# Patient Record
Sex: FEMALE | Race: BLACK OR AFRICAN AMERICAN | NOT HISPANIC OR LATINO | Employment: PART TIME | URBAN - METROPOLITAN AREA
[De-identification: names, ages, dates, MRNs, and addresses within clinical notes are randomized per-mention and may not be internally consistent; named-entity substitution may affect disease eponyms.]

---

## 2017-01-01 ENCOUNTER — OFFICE VISIT (OUTPATIENT)
Dept: VASCULAR SURGERY | Facility: CLINIC | Age: 52
End: 2017-01-01
Payer: MEDICAID

## 2017-01-01 ENCOUNTER — HOSPITAL ENCOUNTER (INPATIENT)
Facility: HOSPITAL | Age: 52
LOS: 2 days | DRG: 023 | End: 2017-12-13
Attending: EMERGENCY MEDICINE | Admitting: PSYCHIATRY & NEUROLOGY
Payer: MEDICARE

## 2017-01-01 ENCOUNTER — HISTORICAL (OUTPATIENT)
Dept: ADMINISTRATIVE | Facility: HOSPITAL | Age: 52
End: 2017-01-01

## 2017-01-01 ENCOUNTER — HOSPITAL ENCOUNTER (EMERGENCY)
Facility: HOSPITAL | Age: 52
Discharge: HOME | End: 2017-03-15
Attending: EMERGENCY MEDICINE
Payer: MEDICAID

## 2017-01-01 VITALS
OXYGEN SATURATION: 99 % | WEIGHT: 122 LBS | BODY MASS INDEX: 22.45 KG/M2 | SYSTOLIC BLOOD PRESSURE: 195 MMHG | TEMPERATURE: 98 F | HEIGHT: 62 IN | RESPIRATION RATE: 16 BRPM | HEART RATE: 88 BPM | DIASTOLIC BLOOD PRESSURE: 80 MMHG

## 2017-01-01 VITALS
SYSTOLIC BLOOD PRESSURE: 184 MMHG | DIASTOLIC BLOOD PRESSURE: 76 MMHG | HEIGHT: 62 IN | HEART RATE: 56 BPM | WEIGHT: 123.44 LBS | BODY MASS INDEX: 22.71 KG/M2

## 2017-01-01 DIAGNOSIS — R56.9 SEIZURE: ICD-10-CM

## 2017-01-01 DIAGNOSIS — A59.9 TRICHOMONAL INFECTION: Primary | ICD-10-CM

## 2017-01-01 DIAGNOSIS — I63.412 EMBOLIC STROKE INVOLVING LEFT MIDDLE CEREBRAL ARTERY: ICD-10-CM

## 2017-01-01 DIAGNOSIS — I65.23 BILATERAL CAROTID ARTERY STENOSIS: Primary | ICD-10-CM

## 2017-01-01 DIAGNOSIS — Z98.890 S/P CAROTID ENDARTERECTOMY: ICD-10-CM

## 2017-01-01 DIAGNOSIS — D62 ACUTE BLOOD LOSS ANEMIA: ICD-10-CM

## 2017-01-01 DIAGNOSIS — I63.9 CEREBROVASCULAR ACCIDENT (CVA), UNSPECIFIED MECHANISM: Primary | ICD-10-CM

## 2017-01-01 DIAGNOSIS — K92.2 GASTROINTESTINAL HEMORRHAGE, UNSPECIFIED GASTROINTESTINAL HEMORRHAGE TYPE: ICD-10-CM

## 2017-01-01 DIAGNOSIS — J96.01 ACUTE RESPIRATORY FAILURE WITH HYPOXIA: ICD-10-CM

## 2017-01-01 DIAGNOSIS — R10.30 LOWER ABDOMINAL PAIN: ICD-10-CM

## 2017-01-01 DIAGNOSIS — N17.9 AKI (ACUTE KIDNEY INJURY): ICD-10-CM

## 2017-01-01 DIAGNOSIS — G93.40 ACUTE ENCEPHALOPATHY: ICD-10-CM

## 2017-01-01 DIAGNOSIS — D64.9 SYMPTOMATIC ANEMIA: ICD-10-CM

## 2017-01-01 LAB
ABO + RH BLD: NORMAL
ABO + RH BLD: NORMAL
ALBUMIN SERPL BCP-MCNC: 1.1 G/DL
ALBUMIN SERPL BCP-MCNC: 1.2 G/DL
ALBUMIN SERPL BCP-MCNC: 1.4 G/DL
ALBUMIN SERPL BCP-MCNC: 1.5 G/DL
ALBUMIN SERPL BCP-MCNC: 1.6 G/DL
ALBUMIN SERPL BCP-MCNC: 1.7 G/DL
ALBUMIN SERPL BCP-MCNC: 1.9 G/DL
ALBUMIN SERPL BCP-MCNC: 2 G/DL
ALBUMIN SERPL BCP-MCNC: 2.3 G/DL
ALBUMIN SERPL BCP-MCNC: 3 G/DL
ALBUMIN SERPL BCP-MCNC: 3.7 G/DL
ALBUMIN SERPL BCP-MCNC: 3.8 G/DL
ALBUMIN SERPL-MCNC: 3.4 G/DL (ref 3.1–4.7)
ALBUMIN SERPL-MCNC: 3.8 G/DL (ref 3.1–4.7)
ALLENS TEST: ABNORMAL
ALP SERPL-CCNC: 106 U/L
ALP SERPL-CCNC: 110 U/L
ALP SERPL-CCNC: 117 U/L
ALP SERPL-CCNC: 119 IU/L (ref 40–104)
ALP SERPL-CCNC: 120 U/L
ALP SERPL-CCNC: 127 U/L
ALP SERPL-CCNC: 136 U/L
ALP SERPL-CCNC: 137 U/L
ALP SERPL-CCNC: 148 U/L
ALP SERPL-CCNC: 157 U/L
ALP SERPL-CCNC: 190 U/L
ALP SERPL-CCNC: 194 U/L
ALP SERPL-CCNC: 225 U/L
ALP SERPL-CCNC: 99 IU/L (ref 40–104)
ALT (SGPT): 17 IU/L (ref 3–33)
ALT (SGPT): 24 IU/L (ref 3–33)
ALT SERPL W/O P-5'-P-CCNC: 12 U/L
ALT SERPL W/O P-5'-P-CCNC: 14 U/L
ALT SERPL W/O P-5'-P-CCNC: 2879 U/L
ALT SERPL W/O P-5'-P-CCNC: 29 U/L
ALT SERPL W/O P-5'-P-CCNC: 3692 U/L
ALT SERPL W/O P-5'-P-CCNC: 3753 U/L
ALT SERPL W/O P-5'-P-CCNC: 3816 U/L
ALT SERPL W/O P-5'-P-CCNC: 4333 U/L
ALT SERPL W/O P-5'-P-CCNC: 4851 U/L
ALT SERPL W/O P-5'-P-CCNC: 49 U/L
ALT SERPL W/O P-5'-P-CCNC: 5904 U/L
ALT SERPL W/O P-5'-P-CCNC: 5906 U/L
AMIODARONE FLD-MCNC: <0.1 UG/ML (ref 1–3)
AMORPH CRY UR QL COMP ASSIST: ABNORMAL
ANION GAP SERPL CALC-SCNC: 12 MMOL/L
ANION GAP SERPL CALC-SCNC: 13 MMOL/L
ANION GAP SERPL CALC-SCNC: 13 MMOL/L
ANION GAP SERPL CALC-SCNC: 18 MMOL/L
ANION GAP SERPL CALC-SCNC: 28 MMOL/L
ANION GAP SERPL CALC-SCNC: 29 MMOL/L
ANION GAP SERPL CALC-SCNC: 34 MMOL/L
ANION GAP SERPL CALC-SCNC: 35 MMOL/L
ANION GAP SERPL CALC-SCNC: 39 MMOL/L
ANION GAP SERPL CALC-SCNC: 51 MMOL/L
ANION GAP SERPL CALC-SCNC: 53 MMOL/L
ANION GAP SERPL CALC-SCNC: 73 MMOL/L
ANISOCYTOSIS BLD QL SMEAR: ABNORMAL
ANISOCYTOSIS BLD QL SMEAR: SLIGHT
AORTIC VALVE REGURGITATION: NORMAL
APAP SERPL-MCNC: 3 UG/ML
APTT BLDCRRT: 30.4 SEC
APTT BLDCRRT: 33.2 SEC
APTT BLDCRRT: 37.7 SEC
APTT BLDCRRT: 49.2 SEC
APTT BLDCRRT: 50.4 SEC
AST SERPL-CCNC: 16 U/L
AST SERPL-CCNC: 23 U/L
AST SERPL-CCNC: 32 IU/L (ref 10–40)
AST SERPL-CCNC: 33 IU/L (ref 10–40)
AST SERPL-CCNC: 3684 U/L
AST SERPL-CCNC: 3801 U/L
AST SERPL-CCNC: 54 U/L
AST SERPL-CCNC: 69 U/L
AST SERPL-CCNC: 7391 U/L
AST SERPL-CCNC: 8052 U/L
AST SERPL-CCNC: 8165 U/L
AST SERPL-CCNC: 9322 U/L
AST SERPL-CCNC: 9340 U/L
AST SERPL-CCNC: ABNORMAL U/L
BACTERIA #/AREA URNS AUTO: ABNORMAL /HPF
BACTERIA #/AREA URNS HPF: ABNORMAL /HPF
BACTERIA GENITAL QL WET PREP: ABNORMAL
BASOPHILS # BLD AUTO: 0.01 K/UL
BASOPHILS # BLD AUTO: 0.02 K/UL
BASOPHILS # BLD AUTO: 0.03 K/UL
BASOPHILS # BLD AUTO: 0.03 K/UL
BASOPHILS NFR BLD: 0 %
BASOPHILS NFR BLD: 0.1 %
BASOPHILS NFR BLD: 0.2 %
BASOPHILS NFR BLD: 0.2 %
BASOPHILS NFR BLD: 0.4 %
BILIRUB SERPL-MCNC: 0.2 MG/DL
BILIRUB SERPL-MCNC: 0.4 MG/DL
BILIRUB SERPL-MCNC: 0.4 MG/DL (ref 0.3–1)
BILIRUB SERPL-MCNC: 0.5 MG/DL
BILIRUB SERPL-MCNC: 0.6 MG/DL (ref 0.3–1)
BILIRUB SERPL-MCNC: 0.8 MG/DL
BILIRUB SERPL-MCNC: 1.4 MG/DL
BILIRUB SERPL-MCNC: 2.1 MG/DL
BILIRUB SERPL-MCNC: 2.2 MG/DL
BILIRUB SERPL-MCNC: 2.5 MG/DL
BILIRUB SERPL-MCNC: 2.5 MG/DL
BILIRUB SERPL-MCNC: 3.1 MG/DL
BILIRUB UR QL STRIP: NEGATIVE
BILIRUB UR QL STRIP: NEGATIVE
BLD GP AB SCN CELLS X3 SERPL QL: NORMAL
BLD GP AB SCN CELLS X3 SERPL QL: NORMAL
BLD PROD TYP BPU: NORMAL
BLOOD UNIT EXPIRATION DATE: NORMAL
BLOOD UNIT TYPE CODE: 5100
BLOOD UNIT TYPE: NORMAL
BNP SERPL-MCNC: 1163 PG/ML
BUN SERPL-MCNC: 12 MG/DL (ref 8–20)
BUN SERPL-MCNC: 13 MG/DL
BUN SERPL-MCNC: 17 MG/DL
BUN SERPL-MCNC: 22 MG/DL
BUN SERPL-MCNC: 22 MG/DL
BUN SERPL-MCNC: 23 MG/DL
BUN SERPL-MCNC: 24 MG/DL
BUN SERPL-MCNC: 25 MG/DL
BUN SERPL-MCNC: 25 MG/DL
BUN SERPL-MCNC: 28 MG/DL
BUN SERPL-MCNC: 28 MG/DL
BUN SERPL-MCNC: 5 MG/DL (ref 6–30)
BUN SERPL-MCNC: 6 MG/DL
BUN SERPL-MCNC: 7 MG/DL
BUN SERPL-MCNC: 8 MG/DL (ref 8–20)
BURR CELLS BLD QL SMEAR: ABNORMAL
CA-I BLDV-SCNC: 0.97 MMOL/L
CALCIUM SERPL-MCNC: 10 MG/DL
CALCIUM SERPL-MCNC: 11.4 MG/DL
CALCIUM SERPL-MCNC: 7.1 MG/DL
CALCIUM SERPL-MCNC: 7.2 MG/DL
CALCIUM SERPL-MCNC: 7.6 MG/DL
CALCIUM SERPL-MCNC: 8.1 MG/DL
CALCIUM SERPL-MCNC: 8.3 MG/DL (ref 7.7–10.4)
CALCIUM SERPL-MCNC: 8.4 MG/DL
CALCIUM SERPL-MCNC: 8.5 MG/DL
CALCIUM SERPL-MCNC: 8.6 MG/DL
CALCIUM SERPL-MCNC: 8.6 MG/DL
CALCIUM SERPL-MCNC: 8.6 MG/DL (ref 7.7–10.4)
CALCIUM SERPL-MCNC: 9.7 MG/DL
CALCIUM SERPL-MCNC: 9.8 MG/DL
CHLORIDE SERPL-SCNC: 100 MMOL/L
CHLORIDE SERPL-SCNC: 101 MMOL/L
CHLORIDE SERPL-SCNC: 102 MMOL/L
CHLORIDE SERPL-SCNC: 102 MMOL/L (ref 95–110)
CHLORIDE SERPL-SCNC: 104 MMOL/L
CHLORIDE SERPL-SCNC: 104 MMOL/L
CHLORIDE SERPL-SCNC: 105 MMOL/L
CHLORIDE SERPL-SCNC: 106 MMOL/L
CHLORIDE SERPL-SCNC: 84 MMOL/L
CHLORIDE SERPL-SCNC: 96 MMOL/L
CHLORIDE SERPL-SCNC: 98 MMOL/L
CHLORIDE SERPL-SCNC: 99 MMOL/L
CHLORIDE SERPL-SCNC: 99 MMOL/L
CHLORIDE: 89 MMOL/L (ref 98–110)
CHLORIDE: 90 MMOL/L (ref 98–110)
CHOLEST SERPL-MCNC: 189 MG/DL
CHOLEST SERPL-MCNC: 205 MG/DL
CHOLEST/HDLC SERPL: 3.7 {RATIO}
CHOLEST/HDLC SERPL: 3.8 {RATIO}
CK MB SERPL-MCNC: 14.7 NG/ML
CK MB SERPL-RTO: 6.3 %
CK SERPL-CCNC: 233 U/L
CK SERPL-CCNC: 233 U/L
CLARITY UR REFRACT.AUTO: ABNORMAL
CLARITY UR: CLEAR
CLUE CELLS VAG QL WET PREP: ABNORMAL
CO2 SERPL-SCNC: 10 MMOL/L
CO2 SERPL-SCNC: 13 MMOL/L
CO2 SERPL-SCNC: 15 MMOL/L
CO2 SERPL-SCNC: 15 MMOL/L
CO2 SERPL-SCNC: 16 MMOL/L
CO2 SERPL-SCNC: 18 MMOL/L
CO2 SERPL-SCNC: 21.6 MMOL/L (ref 22.8–31.6)
CO2 SERPL-SCNC: 22 MMOL/L
CO2 SERPL-SCNC: 25 MMOL/L
CO2 SERPL-SCNC: 28.6 MMOL/L (ref 22.8–31.6)
CO2 SERPL-SCNC: 29 MMOL/L
CO2 SERPL-SCNC: 6 MMOL/L
CODING SYSTEM: NORMAL
COLOR UR AUTO: ABNORMAL
COLOR UR: YELLOW
CREAT SERPL-MCNC: 0.5 MG/DL (ref 0.5–1.4)
CREAT SERPL-MCNC: 0.8 MG/DL
CREAT SERPL-MCNC: 1.1 MG/DL
CREAT SERPL-MCNC: 2 MG/DL
CREAT SERPL-MCNC: 2.1 MG/DL
CREAT SERPL-MCNC: 2.2 MG/DL
CREAT SERPL-MCNC: 2.3 MG/DL
CREAT SERPL-MCNC: 2.4 MG/DL
CREAT SERPL-MCNC: 2.4 MG/DL
CREAT SERPL-MCNC: 2.8 MG/DL
CREAT SERPL-MCNC: 3 MG/DL
CREATININE: 0.93 MG/DL (ref 0.6–1.4)
CREATININE: 1.06 MG/DL (ref 0.6–1.4)
DELSYS: ABNORMAL
DIFFERENTIAL METHOD: ABNORMAL
DIGOXIN SERPL-MCNC: 0.3 NG/ML
DIGOXIN SERPL-MCNC: 0.7 NG/ML
DIGOXIN SERPL-MCNC: 0.8 NG/ML
DISPENSE STATUS: NORMAL
EOSINOPHIL # BLD AUTO: 0 K/UL
EOSINOPHIL NFR BLD: 0 %
EOSINOPHIL NFR BLD: 0.1 %
EOSINOPHIL NFR BLD: 0.1 %
EOSINOPHIL NFR BLD: 19 %
ERYTHROCYTE [DISTWIDTH] IN BLOOD BY AUTOMATED COUNT: 17.2 %
ERYTHROCYTE [DISTWIDTH] IN BLOOD BY AUTOMATED COUNT: 17.3 %
ERYTHROCYTE [DISTWIDTH] IN BLOOD BY AUTOMATED COUNT: 17.3 %
ERYTHROCYTE [DISTWIDTH] IN BLOOD BY AUTOMATED COUNT: 19 %
ERYTHROCYTE [DISTWIDTH] IN BLOOD BY AUTOMATED COUNT: 19 %
ERYTHROCYTE [DISTWIDTH] IN BLOOD BY AUTOMATED COUNT: 19.5 %
ERYTHROCYTE [DISTWIDTH] IN BLOOD BY AUTOMATED COUNT: 19.6 %
ERYTHROCYTE [DISTWIDTH] IN BLOOD BY AUTOMATED COUNT: 20.3 %
ERYTHROCYTE [DISTWIDTH] IN BLOOD BY AUTOMATED COUNT: 20.3 %
ERYTHROCYTE [DISTWIDTH] IN BLOOD BY AUTOMATED COUNT: 20.4 %
ERYTHROCYTE [SEDIMENTATION RATE] IN BLOOD BY WESTERGREN METHOD: 12 MM/H
ERYTHROCYTE [SEDIMENTATION RATE] IN BLOOD BY WESTERGREN METHOD: 14 MM/H
ERYTHROCYTE [SEDIMENTATION RATE] IN BLOOD BY WESTERGREN METHOD: 16 MM/H
ERYTHROCYTE [SEDIMENTATION RATE] IN BLOOD BY WESTERGREN METHOD: 20 MM/H
ERYTHROCYTE [SEDIMENTATION RATE] IN BLOOD BY WESTERGREN METHOD: 22 MM/H
ERYTHROCYTE [SEDIMENTATION RATE] IN BLOOD BY WESTERGREN METHOD: 26 MM/H
EST. GFR  (AFRICAN AMERICAN): 19.8 ML/MIN/1.73 M^2
EST. GFR  (AFRICAN AMERICAN): 21.6 ML/MIN/1.73 M^2
EST. GFR  (AFRICAN AMERICAN): 26 ML/MIN/1.73 M^2
EST. GFR  (AFRICAN AMERICAN): 26 ML/MIN/1.73 M^2
EST. GFR  (AFRICAN AMERICAN): 27.3 ML/MIN/1.73 M^2
EST. GFR  (AFRICAN AMERICAN): 28.9 ML/MIN/1.73 M^2
EST. GFR  (AFRICAN AMERICAN): 30.5 ML/MIN/1.73 M^2
EST. GFR  (AFRICAN AMERICAN): 32.4 ML/MIN/1.73 M^2
EST. GFR  (AFRICAN AMERICAN): >60 ML/MIN/1.73 M^2
EST. GFR  (NON AFRICAN AMERICAN): 17.2 ML/MIN/1.73 M^2
EST. GFR  (NON AFRICAN AMERICAN): 18.7 ML/MIN/1.73 M^2
EST. GFR  (NON AFRICAN AMERICAN): 22.5 ML/MIN/1.73 M^2
EST. GFR  (NON AFRICAN AMERICAN): 22.5 ML/MIN/1.73 M^2
EST. GFR  (NON AFRICAN AMERICAN): 23.7 ML/MIN/1.73 M^2
EST. GFR  (NON AFRICAN AMERICAN): 25 ML/MIN/1.73 M^2
EST. GFR  (NON AFRICAN AMERICAN): 26.5 ML/MIN/1.73 M^2
EST. GFR  (NON AFRICAN AMERICAN): 28.1 ML/MIN/1.73 M^2
EST. GFR  (NON AFRICAN AMERICAN): 57.9 ML/MIN/1.73 M^2
EST. GFR  (NON AFRICAN AMERICAN): >60 ML/MIN/1.73 M^2
ESTIMATED AVG GLUCOSE: 117 MG/DL
FIBRINOGEN PPP-MCNC: 144 MG/DL
FIBRINOGEN PPP-MCNC: <70 MG/DL
FILAMENT FUNGI VAG WET PREP-#/AREA: ABNORMAL
FIO2: 100
FIO2: 40
FIO2: 50
FIO2: 60
FIO2: 70
FIO2: 70
GLUCOSE SERPL-MCNC: 110 MG/DL
GLUCOSE SERPL-MCNC: 126 MG/DL
GLUCOSE SERPL-MCNC: 134 MG/DL
GLUCOSE SERPL-MCNC: 191 MG/DL
GLUCOSE SERPL-MCNC: 252 MG/DL
GLUCOSE SERPL-MCNC: 255 MG/DL
GLUCOSE SERPL-MCNC: 262 MG/DL (ref 70–110)
GLUCOSE SERPL-MCNC: 271 MG/DL
GLUCOSE SERPL-MCNC: 416 MG/DL
GLUCOSE SERPL-MCNC: 483 MG/DL
GLUCOSE SERPL-MCNC: 61 MG/DL
GLUCOSE SERPL-MCNC: 760 MG/DL
GLUCOSE SERPL-MCNC: 83 MG/DL
GLUCOSE UR QL STRIP: ABNORMAL
GLUCOSE UR QL STRIP: NEGATIVE
GLUCOSE: 122 MG/DL (ref 70–99)
GLUCOSE: 84 MG/DL (ref 70–99)
HBA1C MFR BLD HPLC: 5.7 %
HCO3 UR-SCNC: 10.7 MMOL/L (ref 24–28)
HCO3 UR-SCNC: 11.1 MMOL/L (ref 24–28)
HCO3 UR-SCNC: 11.8 MMOL/L (ref 24–28)
HCO3 UR-SCNC: 12.7 MMOL/L (ref 24–28)
HCO3 UR-SCNC: 12.9 MMOL/L (ref 24–28)
HCO3 UR-SCNC: 13.3 MMOL/L (ref 24–28)
HCO3 UR-SCNC: 13.5 MMOL/L (ref 24–28)
HCO3 UR-SCNC: 14.6 MMOL/L (ref 24–28)
HCO3 UR-SCNC: 15 MMOL/L (ref 24–28)
HCO3 UR-SCNC: 15.5 MMOL/L (ref 24–28)
HCO3 UR-SCNC: 16.5 MMOL/L (ref 24–28)
HCO3 UR-SCNC: 20.2 MMOL/L (ref 24–28)
HCO3 UR-SCNC: 20.4 MMOL/L (ref 24–28)
HCO3 UR-SCNC: 20.4 MMOL/L (ref 24–28)
HCO3 UR-SCNC: 20.9 MMOL/L (ref 24–28)
HCO3 UR-SCNC: 7.5 MMOL/L (ref 24–28)
HCO3 UR-SCNC: 8.8 MMOL/L (ref 24–28)
HCO3 UR-SCNC: 9 MMOL/L (ref 24–28)
HCO3 UR-SCNC: 9.3 MMOL/L (ref 24–28)
HCO3 UR-SCNC: 9.5 MMOL/L (ref 24–28)
HCO3 UR-SCNC: 9.9 MMOL/L (ref 24–28)
HCO3 UR-SCNC: 9.9 MMOL/L (ref 24–28)
HCT VFR BLD AUTO: 18.8 %
HCT VFR BLD AUTO: 20.7 %
HCT VFR BLD AUTO: 22.7 %
HCT VFR BLD AUTO: 26.6 %
HCT VFR BLD AUTO: 27.2 %
HCT VFR BLD AUTO: 28.1 %
HCT VFR BLD AUTO: 30.2 %
HCT VFR BLD AUTO: 30.8 %
HCT VFR BLD AUTO: 31.4 % (ref 36–48)
HCT VFR BLD AUTO: 31.7 % (ref 36–48)
HCT VFR BLD AUTO: 32.9 %
HCT VFR BLD AUTO: 32.9 %
HCT VFR BLD AUTO: 39.2 %
HCT VFR BLD CALC: 15 %PCV (ref 36–54)
HCT VFR BLD CALC: 20 %PCV (ref 36–54)
HCT VFR BLD CALC: 21 %PCV (ref 36–54)
HCT VFR BLD CALC: 21 %PCV (ref 36–54)
HCT VFR BLD CALC: 22 %PCV (ref 36–54)
HCT VFR BLD CALC: 22 %PCV (ref 36–54)
HCT VFR BLD CALC: 27 %PCV (ref 36–54)
HCT VFR BLD CALC: 27 %PCV (ref 36–54)
HCT VFR BLD CALC: 40 %PCV (ref 36–54)
HCT VFR BLD CALC: 62 %PCV (ref 36–54)
HDLC SERPL-MCNC: 50 MG/DL
HDLC SERPL-MCNC: 56 MG/DL
HDLC SERPL: 26.5 %
HDLC SERPL: 27.3 %
HGB BLD-MCNC: 10.2 G/DL
HGB BLD-MCNC: 10.2 G/DL
HGB BLD-MCNC: 12.2 G/DL
HGB BLD-MCNC: 5.4 G/DL
HGB BLD-MCNC: 6.1 G/DL
HGB BLD-MCNC: 6.3 G/DL
HGB BLD-MCNC: 7.8 G/DL
HGB BLD-MCNC: 8.4 G/DL
HGB BLD-MCNC: 8.9 G/DL
HGB BLD-MCNC: 9.1 G/DL
HGB BLD-MCNC: 9.3 G/DL
HGB BLD-MCNC: 9.6 G/DL (ref 12–15)
HGB BLD-MCNC: 9.8 G/DL (ref 12–15)
HGB UR QL STRIP: ABNORMAL
HGB UR QL STRIP: NEGATIVE
HYALINE CASTS UR QL AUTO: 0 /LPF
HYPOCHROMIA BLD QL SMEAR: ABNORMAL
IMM GRANULOCYTES # BLD AUTO: 0.06 K/UL
IMM GRANULOCYTES # BLD AUTO: 0.12 K/UL
IMM GRANULOCYTES # BLD AUTO: 0.12 K/UL
IMM GRANULOCYTES # BLD AUTO: 0.21 K/UL
IMM GRANULOCYTES # BLD AUTO: 0.25 K/UL
IMM GRANULOCYTES # BLD AUTO: 0.31 K/UL
IMM GRANULOCYTES # BLD AUTO: 0.36 K/UL
IMM GRANULOCYTES # BLD AUTO: 0.37 K/UL
IMM GRANULOCYTES # BLD AUTO: 0.69 K/UL
IMM GRANULOCYTES NFR BLD AUTO: 0.5 %
IMM GRANULOCYTES NFR BLD AUTO: 0.6 %
IMM GRANULOCYTES NFR BLD AUTO: 1 %
IMM GRANULOCYTES NFR BLD AUTO: 1.5 %
IMM GRANULOCYTES NFR BLD AUTO: 2.1 %
IMM GRANULOCYTES NFR BLD AUTO: 2.3 %
IMM GRANULOCYTES NFR BLD AUTO: 2.5 %
IMM GRANULOCYTES NFR BLD AUTO: 2.6 %
IMM GRANULOCYTES NFR BLD AUTO: 4.5 %
INR PPP: 1.2
INR PPP: 1.8
INR PPP: 2.3
INR PPP: 2.4
INR PPP: 2.8
INR PPP: 4.7
KETONES UR QL STRIP: NEGATIVE
KETONES UR QL STRIP: NEGATIVE
LACTATE SERPL-SCNC: 1.3 MMOL/L
LACTATE SERPL-SCNC: >12 MMOL/L
LDLC SERPL CALC-MCNC: 127.6 MG/DL
LDLC SERPL CALC-MCNC: 136.6 MG/DL
LEUKOCYTE ESTERASE UR QL STRIP: ABNORMAL
LEUKOCYTE ESTERASE UR QL STRIP: NEGATIVE
LIPASE SERPL-CCNC: 18 U/L
LYMPHOCYTES # BLD AUTO: 0.5 K/UL
LYMPHOCYTES # BLD AUTO: 0.6 K/UL
LYMPHOCYTES # BLD AUTO: 0.7 K/UL
LYMPHOCYTES # BLD AUTO: 0.8 K/UL
LYMPHOCYTES # BLD AUTO: 0.9 K/UL
LYMPHOCYTES # BLD AUTO: 0.9 K/UL
LYMPHOCYTES # BLD AUTO: 1 K/UL
LYMPHOCYTES # BLD AUTO: 1.2 K/UL
LYMPHOCYTES # BLD AUTO: 2 K/UL
LYMPHOCYTES NFR BLD: 10.5 %
LYMPHOCYTES NFR BLD: 12 %
LYMPHOCYTES NFR BLD: 2.6 %
LYMPHOCYTES NFR BLD: 20 %
LYMPHOCYTES NFR BLD: 3.5 %
LYMPHOCYTES NFR BLD: 4.7 %
LYMPHOCYTES NFR BLD: 5 %
LYMPHOCYTES NFR BLD: 7.6 %
LYMPHOCYTES NFR BLD: 8.9 %
LYMPHOCYTES NFR BLD: 9.2 %
MAGNESIUM SERPL-MCNC: 1.9 MG/DL
MAGNESIUM SERPL-MCNC: 2 MG/DL
MAGNESIUM SERPL-MCNC: 2.4 MG/DL
MAGNESIUM SERPL-MCNC: 2.5 MG/DL
MCH RBC QN AUTO: 23.1 PG
MCH RBC QN AUTO: 23.8 PG (ref 25–35)
MCH RBC QN AUTO: 24.5 PG
MCH RBC QN AUTO: 24.6 PG
MCH RBC QN AUTO: 24.8 PG
MCH RBC QN AUTO: 25.7 PG
MCH RBC QN AUTO: 25.7 PG
MCH RBC QN AUTO: 25.8 PG
MCH RBC QN AUTO: 27.2 PG
MCH RBC QN AUTO: 27.5 PG
MCH RBC QN AUTO: 27.8 PG
MCHC RBC AUTO-ENTMCNC: 26.9 G/DL
MCHC RBC AUTO-ENTMCNC: 29.3 G/DL
MCHC RBC AUTO-ENTMCNC: 30.1 G/DL
MCHC RBC AUTO-ENTMCNC: 30.2 G/DL
MCHC RBC AUTO-ENTMCNC: 30.4 G/DL
MCHC RBC AUTO-ENTMCNC: 30.6 G/DL (ref 31–36)
MCHC RBC AUTO-ENTMCNC: 30.9 %
MCHC RBC AUTO-ENTMCNC: 30.9 G/DL
MCHC RBC AUTO-ENTMCNC: 31 G/DL
MCHC RBC AUTO-ENTMCNC: 31.1 G/DL
MCHC RBC AUTO-ENTMCNC: 31.7 G/DL
MCV RBC AUTO: 75 FL
MCV RBC AUTO: 77.9 FL (ref 79–98)
MCV RBC AUTO: 79 FL
MCV RBC AUTO: 80 FL
MCV RBC AUTO: 85 FL
MCV RBC AUTO: 86 FL
MCV RBC AUTO: 88 FL
MCV RBC AUTO: 88 FL
MCV RBC AUTO: 89 FL
MCV RBC AUTO: 89 FL
MCV RBC AUTO: 92 FL
MICROSCOPIC COMMENT: ABNORMAL
MICROSCOPIC COMMENT: ABNORMAL
MIN VOL: 7.7
MIN VOL: 8.51
MIN VOL: 8.78
MODE: ABNORMAL
MONOCYTES # BLD AUTO: 0.1 K/UL
MONOCYTES # BLD AUTO: 0.2 K/UL
MONOCYTES # BLD AUTO: 0.2 K/UL
MONOCYTES # BLD AUTO: 0.3 K/UL
MONOCYTES # BLD AUTO: 0.4 K/UL
MONOCYTES # BLD AUTO: 0.4 K/UL
MONOCYTES # BLD AUTO: 0.7 K/UL
MONOCYTES # BLD AUTO: 1.1 K/UL
MONOCYTES # BLD AUTO: 1.4 K/UL
MONOCYTES NFR BLD: 0.8 %
MONOCYTES NFR BLD: 1.7 %
MONOCYTES NFR BLD: 1.9 %
MONOCYTES NFR BLD: 10 %
MONOCYTES NFR BLD: 2 %
MONOCYTES NFR BLD: 2.7 %
MONOCYTES NFR BLD: 4.4 %
MONOCYTES NFR BLD: 5 %
MONOCYTES NFR BLD: 5.1 %
MONOCYTES NFR BLD: 5.5 %
N-DESETHYL-AMIODARONE: <0.1 UG/ML (ref 1–3)
NEUTROPHILS # BLD AUTO: 11.1 K/UL
NEUTROPHILS # BLD AUTO: 12.3 K/UL
NEUTROPHILS # BLD AUTO: 13.5 K/UL
NEUTROPHILS # BLD AUTO: 17.6 K/UL
NEUTROPHILS # BLD AUTO: 22.3 K/UL
NEUTROPHILS # BLD AUTO: 22.7 K/UL
NEUTROPHILS # BLD AUTO: 6.5 K/UL
NEUTROPHILS # BLD AUTO: 6.7 K/UL
NEUTROPHILS # BLD AUTO: 8.3 K/UL
NEUTROPHILS NFR BLD: 51 %
NEUTROPHILS NFR BLD: 81.4 %
NEUTROPHILS NFR BLD: 83.3 %
NEUTROPHILS NFR BLD: 84.4 %
NEUTROPHILS NFR BLD: 84.6 %
NEUTROPHILS NFR BLD: 84.8 %
NEUTROPHILS NFR BLD: 89.1 %
NEUTROPHILS NFR BLD: 90.5 %
NEUTROPHILS NFR BLD: 94.6 %
NEUTROPHILS NFR BLD: 95 %
NITRITE UR QL STRIP: NEGATIVE
NITRITE UR QL STRIP: NEGATIVE
NONHDLC SERPL-MCNC: 139 MG/DL
NONHDLC SERPL-MCNC: 149 MG/DL
NRBC BLD-RTO: 0 /100 WBC
NRBC BLD-RTO: 1 /100 WBC
NRBC BLD-RTO: 10 /100 WBC
NRBC BLD-RTO: 10 /100 WBC
NRBC BLD-RTO: 2 /100 WBC
NRBC BLD-RTO: 5 /100 WBC
NRBC BLD-RTO: 6 /100 WBC
NUCLEATED RBCS: 0 %
NUM UNITS TRANS FFP: NORMAL
OVALOCYTES BLD QL SMEAR: ABNORMAL
PCO2 BLDA: 15.2 MMHG (ref 35–45)
PCO2 BLDA: 18.4 MMHG (ref 35–45)
PCO2 BLDA: 18.6 MMHG (ref 35–45)
PCO2 BLDA: 18.8 MMHG (ref 35–45)
PCO2 BLDA: 19.6 MMHG (ref 35–45)
PCO2 BLDA: 20.8 MMHG (ref 35–45)
PCO2 BLDA: 21 MMHG (ref 35–45)
PCO2 BLDA: 21.1 MMHG (ref 35–45)
PCO2 BLDA: 21.5 MMHG (ref 35–45)
PCO2 BLDA: 22.6 MMHG (ref 35–45)
PCO2 BLDA: 22.9 MMHG (ref 35–45)
PCO2 BLDA: 24.2 MMHG (ref 35–45)
PCO2 BLDA: 24.5 MMHG (ref 35–45)
PCO2 BLDA: 24.5 MMHG (ref 35–45)
PCO2 BLDA: 26.6 MMHG (ref 35–45)
PCO2 BLDA: 26.9 MMHG (ref 35–45)
PCO2 BLDA: 28.4 MMHG (ref 35–45)
PCO2 BLDA: 29.3 MMHG (ref 35–45)
PCO2 BLDA: 29.5 MMHG (ref 35–45)
PCO2 BLDA: 30.5 MMHG (ref 35–45)
PCO2 BLDA: 42.8 MMHG (ref 35–45)
PCO2 BLDA: 43.8 MMHG (ref 35–45)
PEEP: 5
PH SMN: 6.96 [PH] (ref 7.35–7.45)
PH SMN: 7.16 [PH] (ref 7.35–7.45)
PH SMN: 7.17 [PH] (ref 7.35–7.45)
PH SMN: 7.21 [PH] (ref 7.35–7.45)
PH SMN: 7.21 [PH] (ref 7.35–7.45)
PH SMN: 7.25 [PH] (ref 7.35–7.45)
PH SMN: 7.26 [PH] (ref 7.35–7.45)
PH SMN: 7.29 [PH] (ref 7.35–7.45)
PH SMN: 7.31 [PH] (ref 7.35–7.45)
PH SMN: 7.36 [PH] (ref 7.35–7.45)
PH SMN: 7.37 [PH] (ref 7.35–7.45)
PH SMN: 7.37 [PH] (ref 7.35–7.45)
PH SMN: 7.38 [PH] (ref 7.35–7.45)
PH SMN: 7.39 [PH] (ref 7.35–7.45)
PH SMN: 7.4 [PH] (ref 7.35–7.45)
PH SMN: 7.44 [PH] (ref 7.35–7.45)
PH SMN: 7.45 [PH] (ref 7.35–7.45)
PH SMN: 7.47 [PH] (ref 7.35–7.45)
PH SMN: 7.49 [PH] (ref 7.35–7.45)
PH SMN: 7.49 [PH] (ref 7.35–7.45)
PH UR STRIP: 5 [PH] (ref 5–8)
PH UR STRIP: 6 [PH] (ref 5–8)
PHOSPHATE SERPL-MCNC: 11.7 MG/DL
PHOSPHATE SERPL-MCNC: 3.2 MG/DL
PHOSPHATE SERPL-MCNC: 5.7 MG/DL
PIP: 17
PIP: 20
PIP: 24
PIP: 25
PIP: 27
PIP: 27
PIP: 28
PIP: 42
PLATELET # BLD AUTO: 116 K/UL
PLATELET # BLD AUTO: 153 K/UL
PLATELET # BLD AUTO: 211 K/UL
PLATELET # BLD AUTO: 30 K/UL
PLATELET # BLD AUTO: 383 K/UL (ref 140–440)
PLATELET # BLD AUTO: 417 K/UL
PLATELET # BLD AUTO: 47 K/UL
PLATELET # BLD AUTO: 478 K/UL
PLATELET # BLD AUTO: 51 K/UL
PLATELET # BLD AUTO: 525 K/UL
PLATELET # BLD AUTO: 72 K/UL
PLATELET BLD QL SMEAR: ABNORMAL
PMV BLD AUTO: 10.6 FL
PMV BLD AUTO: 7.1 FL
PMV BLD AUTO: 8.7 FL
PMV BLD AUTO: 8.9 FL
PMV BLD AUTO: 9.7 FL
PMV BLD AUTO: 9.7 FL
PMV BLD AUTO: ABNORMAL FL
PO2 BLDA: 107 MMHG (ref 80–100)
PO2 BLDA: 118 MMHG (ref 80–100)
PO2 BLDA: 143 MMHG (ref 80–100)
PO2 BLDA: 191 MMHG (ref 80–100)
PO2 BLDA: 213 MMHG (ref 80–100)
PO2 BLDA: 240 MMHG (ref 80–100)
PO2 BLDA: 390 MMHG (ref 80–100)
PO2 BLDA: 63 MMHG (ref 80–100)
PO2 BLDA: 70 MMHG (ref 80–100)
PO2 BLDA: 71 MMHG (ref 80–100)
PO2 BLDA: 72 MMHG (ref 80–100)
PO2 BLDA: 74 MMHG (ref 80–100)
PO2 BLDA: 76 MMHG (ref 80–100)
PO2 BLDA: 76 MMHG (ref 80–100)
PO2 BLDA: 77 MMHG (ref 80–100)
PO2 BLDA: 82 MMHG (ref 80–100)
PO2 BLDA: 82 MMHG (ref 80–100)
PO2 BLDA: 83 MMHG (ref 80–100)
PO2 BLDA: 84 MMHG (ref 80–100)
PO2 BLDA: 85 MMHG (ref 80–100)
PO2 BLDA: 89 MMHG (ref 80–100)
PO2 BLDA: 93 MMHG (ref 80–100)
POC BE: -10 MMOL/L
POC BE: -11 MMOL/L
POC BE: -12 MMOL/L
POC BE: -13 MMOL/L
POC BE: -13 MMOL/L
POC BE: -14 MMOL/L
POC BE: -15 MMOL/L
POC BE: -15 MMOL/L
POC BE: -17 MMOL/L
POC BE: -17 MMOL/L
POC BE: -18 MMOL/L
POC BE: -18 MMOL/L
POC BE: -19 MMOL/L
POC BE: -21 MMOL/L
POC BE: -22 MMOL/L
POC BE: -3 MMOL/L
POC BE: -4 MMOL/L
POC BE: -9 MMOL/L
POC IONIZED CALCIUM: 0.64 MMOL/L (ref 1.06–1.42)
POC IONIZED CALCIUM: 0.66 MMOL/L (ref 1.06–1.42)
POC IONIZED CALCIUM: 0.68 MMOL/L (ref 1.06–1.42)
POC IONIZED CALCIUM: 0.75 MMOL/L (ref 1.06–1.42)
POC IONIZED CALCIUM: 0.83 MMOL/L (ref 1.06–1.42)
POC IONIZED CALCIUM: 0.87 MMOL/L (ref 1.06–1.42)
POC IONIZED CALCIUM: 0.88 MMOL/L (ref 1.06–1.42)
POC IONIZED CALCIUM: 0.94 MMOL/L (ref 1.06–1.42)
POC IONIZED CALCIUM: 0.99 MMOL/L (ref 1.06–1.42)
POC IONIZED CALCIUM: 1.13 MMOL/L (ref 1.06–1.42)
POC SATURATED O2: 100 % (ref 95–100)
POC SATURATED O2: 89 % (ref 95–100)
POC SATURATED O2: 92 % (ref 95–100)
POC SATURATED O2: 94 % (ref 95–100)
POC SATURATED O2: 94 % (ref 95–100)
POC SATURATED O2: 95 % (ref 95–100)
POC SATURATED O2: 96 % (ref 95–100)
POC SATURATED O2: 97 % (ref 95–100)
POC SATURATED O2: 98 % (ref 95–100)
POC SATURATED O2: 99 % (ref 95–100)
POC TCO2 (MEASURED): 19 MMOL/L (ref 23–29)
POC TCO2: 10 MMOL/L (ref 23–27)
POC TCO2: 11 MMOL/L (ref 23–27)
POC TCO2: 12 MMOL/L (ref 23–27)
POC TCO2: 12 MMOL/L (ref 23–27)
POC TCO2: 14 MMOL/L (ref 23–27)
POC TCO2: 15 MMOL/L (ref 23–27)
POC TCO2: 16 MMOL/L (ref 23–27)
POC TCO2: 17 MMOL/L (ref 23–27)
POC TCO2: 17 MMOL/L (ref 23–27)
POC TCO2: 21 MMOL/L (ref 23–27)
POC TCO2: 22 MMOL/L (ref 23–27)
POC TCO2: 8 MMOL/L (ref 23–27)
POC TCO2: 9 MMOL/L (ref 23–27)
POCT GLUCOSE: 105 MG/DL (ref 70–110)
POCT GLUCOSE: 165 MG/DL (ref 70–110)
POCT GLUCOSE: 170 MG/DL (ref 70–110)
POCT GLUCOSE: 181 MG/DL (ref 70–110)
POCT GLUCOSE: 186 MG/DL (ref 70–110)
POCT GLUCOSE: 193 MG/DL (ref 70–110)
POCT GLUCOSE: 211 MG/DL (ref 70–110)
POCT GLUCOSE: 217 MG/DL (ref 70–110)
POCT GLUCOSE: 220 MG/DL (ref 70–110)
POCT GLUCOSE: 220 MG/DL (ref 70–110)
POCT GLUCOSE: 226 MG/DL (ref 70–110)
POCT GLUCOSE: 245 MG/DL (ref 70–110)
POCT GLUCOSE: 275 MG/DL (ref 70–110)
POCT GLUCOSE: 303 MG/DL (ref 70–110)
POCT GLUCOSE: 382 MG/DL (ref 70–110)
POCT GLUCOSE: 398 MG/DL (ref 70–110)
POCT GLUCOSE: 400 MG/DL (ref 70–110)
POCT GLUCOSE: 414 MG/DL (ref 70–110)
POCT GLUCOSE: 52 MG/DL (ref 70–110)
POCT GLUCOSE: 73 MG/DL (ref 70–110)
POCT GLUCOSE: 99 MG/DL (ref 70–110)
POCT GLUCOSE: <20 MG/DL (ref 70–110)
POCT GLUCOSE: >500 MG/DL (ref 70–110)
POCT GLUCOSE: >500 MG/DL (ref 70–110)
POIKILOCYTOSIS BLD QL SMEAR: ABNORMAL
POIKILOCYTOSIS BLD QL SMEAR: SLIGHT
POLYCHROMASIA BLD QL SMEAR: ABNORMAL
POTASSIUM BLD-SCNC: 3.3 MMOL/L (ref 3.5–5.1)
POTASSIUM BLD-SCNC: 4 MMOL/L (ref 3.5–5.1)
POTASSIUM BLD-SCNC: 4.3 MMOL/L (ref 3.5–5.1)
POTASSIUM BLD-SCNC: 4.3 MMOL/L (ref 3.5–5.1)
POTASSIUM BLD-SCNC: 4.4 MMOL/L (ref 3.5–5.1)
POTASSIUM BLD-SCNC: 4.9 MMOL/L (ref 3.5–5.1)
POTASSIUM BLD-SCNC: 5.8 MMOL/L (ref 3.5–5.1)
POTASSIUM BLD-SCNC: 6 MMOL/L (ref 3.5–5.1)
POTASSIUM BLD-SCNC: 7.5 MMOL/L (ref 3.5–5.1)
POTASSIUM BLD-SCNC: 7.7 MMOL/L (ref 3.5–5.1)
POTASSIUM SERPL-SCNC: 3.3 MMOL/L
POTASSIUM SERPL-SCNC: 3.4 MMOL/L
POTASSIUM SERPL-SCNC: 3.4 MMOL/L
POTASSIUM SERPL-SCNC: 3.5 MMOL/L
POTASSIUM SERPL-SCNC: 3.5 MMOL/L
POTASSIUM SERPL-SCNC: 3.5 MMOL/L (ref 3.5–5)
POTASSIUM SERPL-SCNC: 3.7 MMOL/L
POTASSIUM SERPL-SCNC: 4.3 MMOL/L
POTASSIUM SERPL-SCNC: 4.6 MMOL/L
POTASSIUM SERPL-SCNC: 4.9 MMOL/L (ref 3.5–5)
POTASSIUM SERPL-SCNC: 5 MMOL/L
POTASSIUM SERPL-SCNC: 5.1 MMOL/L
POTASSIUM SERPL-SCNC: 5.8 MMOL/L
POTASSIUM SERPL-SCNC: 6.1 MMOL/L
POTASSIUM SERPL-SCNC: 8.8 MMOL/L
PROCALCITONIN SERPL IA-MCNC: 0.45 NG/ML
PROT SERPL-MCNC: 2.6 G/DL
PROT SERPL-MCNC: 2.7 G/DL
PROT SERPL-MCNC: 3.1 G/DL
PROT SERPL-MCNC: 3.6 G/DL
PROT SERPL-MCNC: 3.7 G/DL
PROT SERPL-MCNC: 4 G/DL
PROT SERPL-MCNC: 4.1 G/DL
PROT SERPL-MCNC: 4.1 G/DL
PROT SERPL-MCNC: 5.2 G/DL
PROT SERPL-MCNC: 6.5 G/DL (ref 6–8.2)
PROT SERPL-MCNC: 7.1 G/DL
PROT SERPL-MCNC: 7.4 G/DL (ref 6–8.2)
PROT SERPL-MCNC: 8.1 G/DL
PROT SERPL-MCNC: 8.5 G/DL
PROT UR QL STRIP: ABNORMAL
PROT UR QL STRIP: NEGATIVE
PROTHROMBIN TIME: 12.3 SEC
PROTHROMBIN TIME: 18.5 SEC
PROTHROMBIN TIME: 23.3 SEC
PROTHROMBIN TIME: 24 SEC
PROTHROMBIN TIME: 28.3 SEC
PROTHROMBIN TIME: 47.7 SEC
RBC # BLD AUTO: 2.32 M/UL
RBC # BLD AUTO: 2.46 M/UL
RBC # BLD AUTO: 3.03 M/UL
RBC # BLD AUTO: 3.06 M/UL
RBC # BLD AUTO: 3.2 M/UL
RBC # BLD AUTO: 3.54 M/UL
RBC # BLD AUTO: 3.6 M/UL
RBC # BLD AUTO: 4.03 M/UL (ref 3.5–5.5)
RBC # BLD AUTO: 4.14 M/UL
RBC # BLD AUTO: 4.41 M/UL
RBC # BLD AUTO: 4.97 M/UL
RBC #/AREA URNS AUTO: 3 /HPF (ref 0–4)
RBC #/AREA URNS HPF: 0 /HPF (ref 0–4)
RETIRED EF AND QEF - SEE NOTES: 45 (ref 55–65)
SAMPLE: ABNORMAL
SCHISTOCYTES BLD QL SMEAR: ABNORMAL
SCHISTOCYTES BLD QL SMEAR: PRESENT
SITE: ABNORMAL
SODIUM BLD-SCNC: 133 MMOL/L (ref 136–145)
SODIUM BLD-SCNC: 136 MMOL/L (ref 136–145)
SODIUM BLD-SCNC: 143 MMOL/L (ref 136–145)
SODIUM BLD-SCNC: 149 MMOL/L (ref 136–145)
SODIUM BLD-SCNC: 149 MMOL/L (ref 136–145)
SODIUM BLD-SCNC: 150 MMOL/L (ref 136–145)
SODIUM BLD-SCNC: 151 MMOL/L (ref 136–145)
SODIUM BLD-SCNC: 152 MMOL/L (ref 136–145)
SODIUM SERPL-SCNC: 135 MMOL/L
SODIUM SERPL-SCNC: 142 MMOL/L
SODIUM SERPL-SCNC: 146 MMOL/L
SODIUM SERPL-SCNC: 150 MMOL/L
SODIUM SERPL-SCNC: 150 MMOL/L
SODIUM SERPL-SCNC: 151 MMOL/L
SODIUM SERPL-SCNC: 156 MMOL/L
SODIUM SERPL-SCNC: 159 MMOL/L
SODIUM SERPL-SCNC: 167 MMOL/L
SODIUM SERPL-SCNC: 175 MMOL/L
SODIUM: 124 MMOL/L (ref 134–144)
SODIUM: 127 MMOL/L (ref 134–144)
SP GR UR STRIP: 1.01 (ref 1–1.03)
SP GR UR STRIP: <=1.005 (ref 1–1.03)
SP02: 100
SPECIMEN SOURCE: ABNORMAL
SQUAMOUS #/AREA URNS HPF: 1 /HPF
T VAGINALIS GENITAL QL WET PREP: ABNORMAL
T4 FREE SERPL-MCNC: 1.59 NG/DL
T4 FREE SERPL-MCNC: 1.76 NG/DL
TRANS ERYTHROCYTES VOL PATIENT: NORMAL ML
TRICHOMONAS UR QL MICRO: ABNORMAL
TRICUSPID VALVE REGURGITATION: NORMAL
TRIGL SERPL-MCNC: 57 MG/DL
TRIGL SERPL-MCNC: 62 MG/DL
TROPONIN I SERPL DL<=0.01 NG/ML-MCNC: 0.08 NG/ML
TROPONIN I SERPL DL<=0.01 NG/ML-MCNC: 0.17 NG/ML
TROPONIN I SERPL DL<=0.01 NG/ML-MCNC: 0.23 NG/ML
TSH SERPL DL<=0.005 MIU/L-ACNC: 0.11 UIU/ML
TSH SERPL DL<=0.005 MIU/L-ACNC: 0.12 UIU/ML
UNIT NUMBER: NORMAL
UNIT NUMBER: NORMAL
URN SPEC COLLECT METH UR: ABNORMAL
URN SPEC COLLECT METH UR: ABNORMAL
UROBILINOGEN UR STRIP-ACNC: NEGATIVE EU/DL
UROBILINOGEN UR STRIP-ACNC: NEGATIVE EU/DL
VT: 400
VT: 500
VT: 531
WBC # BLD AUTO: 12.97 K/UL
WBC # BLD AUTO: 13.36 K/UL
WBC # BLD AUTO: 14.91 K/UL
WBC # BLD AUTO: 19.78 K/UL
WBC # BLD AUTO: 23.95 K/UL
WBC # BLD AUTO: 26.38 K/UL
WBC # BLD AUTO: 6.9 K/UL
WBC # BLD AUTO: 7.84 K/UL
WBC # BLD AUTO: 8 K/UL
WBC # BLD AUTO: 8.4 K/UL (ref 5–10)
WBC # BLD AUTO: 9.8 K/UL
WBC #/AREA URNS AUTO: 2 /HPF (ref 0–5)
WBC #/AREA URNS HPF: 3 /HPF (ref 0–5)
WBC #/AREA VAG WET PREP: ABNORMAL
YEAST GENITAL QL WET PREP: ABNORMAL
YEAST UR QL AUTO: ABNORMAL

## 2017-01-01 PROCEDURE — 85007 BL SMEAR W/DIFF WBC COUNT: CPT

## 2017-01-01 PROCEDURE — 84439 ASSAY OF FREE THYROXINE: CPT

## 2017-01-01 PROCEDURE — 27000221 HC OXYGEN, UP TO 24 HOURS

## 2017-01-01 PROCEDURE — 63600175 PHARM REV CODE 636 W HCPCS: Performed by: PSYCHIATRY & NEUROLOGY

## 2017-01-01 PROCEDURE — 83605 ASSAY OF LACTIC ACID: CPT

## 2017-01-01 PROCEDURE — 93306 TTE W/DOPPLER COMPLETE: CPT

## 2017-01-01 PROCEDURE — 99285 EMERGENCY DEPT VISIT HI MDM: CPT | Mod: ,,, | Performed by: EMERGENCY MEDICINE

## 2017-01-01 PROCEDURE — 20000000 HC ICU ROOM

## 2017-01-01 PROCEDURE — 84132 ASSAY OF SERUM POTASSIUM: CPT

## 2017-01-01 PROCEDURE — 85610 PROTHROMBIN TIME: CPT

## 2017-01-01 PROCEDURE — 87040 BLOOD CULTURE FOR BACTERIA: CPT | Mod: 59

## 2017-01-01 PROCEDURE — 97110 THERAPEUTIC EXERCISES: CPT

## 2017-01-01 PROCEDURE — P9021 RED BLOOD CELLS UNIT: HCPCS

## 2017-01-01 PROCEDURE — 63600175 PHARM REV CODE 636 W HCPCS: Performed by: EMERGENCY MEDICINE

## 2017-01-01 PROCEDURE — 93005 ELECTROCARDIOGRAM TRACING: CPT

## 2017-01-01 PROCEDURE — 86901 BLOOD TYPING SEROLOGIC RH(D): CPT

## 2017-01-01 PROCEDURE — A4216 STERILE WATER/SALINE, 10 ML: HCPCS | Performed by: STUDENT IN AN ORGANIZED HEALTH CARE EDUCATION/TRAINING PROGRAM

## 2017-01-01 PROCEDURE — P9017 PLASMA 1 DONOR FRZ W/IN 8 HR: HCPCS

## 2017-01-01 PROCEDURE — 84295 ASSAY OF SERUM SODIUM: CPT

## 2017-01-01 PROCEDURE — 84439 ASSAY OF FREE THYROXINE: CPT | Mod: 91

## 2017-01-01 PROCEDURE — 63600175 PHARM REV CODE 636 W HCPCS: Performed by: STUDENT IN AN ORGANIZED HEALTH CARE EDUCATION/TRAINING PROGRAM

## 2017-01-01 PROCEDURE — 5A1945Z RESPIRATORY VENTILATION, 24-96 CONSECUTIVE HOURS: ICD-10-PCS | Performed by: PSYCHIATRY & NEUROLOGY

## 2017-01-01 PROCEDURE — 86920 COMPATIBILITY TEST SPIN: CPT

## 2017-01-01 PROCEDURE — 85027 COMPLETE CBC AUTOMATED: CPT

## 2017-01-01 PROCEDURE — 99024 POSTOP FOLLOW-UP VISIT: CPT | Mod: S$GLB,,, | Performed by: THORACIC SURGERY (CARDIOTHORACIC VASCULAR SURGERY)

## 2017-01-01 PROCEDURE — 25000003 PHARM REV CODE 250: Performed by: NURSE PRACTITIONER

## 2017-01-01 PROCEDURE — 25000003 PHARM REV CODE 250

## 2017-01-01 PROCEDURE — 83036 HEMOGLOBIN GLYCOSYLATED A1C: CPT

## 2017-01-01 PROCEDURE — 83690 ASSAY OF LIPASE: CPT

## 2017-01-01 PROCEDURE — 80053 COMPREHEN METABOLIC PANEL: CPT | Mod: 91

## 2017-01-01 PROCEDURE — 85730 THROMBOPLASTIN TIME PARTIAL: CPT

## 2017-01-01 PROCEDURE — 25000003 PHARM REV CODE 250: Performed by: PSYCHIATRY & NEUROLOGY

## 2017-01-01 PROCEDURE — 80061 LIPID PANEL: CPT

## 2017-01-01 PROCEDURE — 85014 HEMATOCRIT: CPT

## 2017-01-01 PROCEDURE — 87205 SMEAR GRAM STAIN: CPT

## 2017-01-01 PROCEDURE — 83735 ASSAY OF MAGNESIUM: CPT

## 2017-01-01 PROCEDURE — 84484 ASSAY OF TROPONIN QUANT: CPT | Mod: 91

## 2017-01-01 PROCEDURE — 63600175 PHARM REV CODE 636 W HCPCS: Performed by: NURSE PRACTITIONER

## 2017-01-01 PROCEDURE — 85610 PROTHROMBIN TIME: CPT | Mod: 91

## 2017-01-01 PROCEDURE — 85730 THROMBOPLASTIN TIME PARTIAL: CPT | Mod: 91

## 2017-01-01 PROCEDURE — 83880 ASSAY OF NATRIURETIC PEPTIDE: CPT

## 2017-01-01 PROCEDURE — 93010 ELECTROCARDIOGRAM REPORT: CPT | Mod: ,,, | Performed by: INTERNAL MEDICINE

## 2017-01-01 PROCEDURE — 27200966 HC CLOSED SUCTION SYSTEM

## 2017-01-01 PROCEDURE — 84443 ASSAY THYROID STIM HORMONE: CPT

## 2017-01-01 PROCEDURE — 99292 CRITICAL CARE ADDL 30 MIN: CPT | Mod: ICN,,, | Performed by: PSYCHIATRY & NEUROLOGY

## 2017-01-01 PROCEDURE — 36415 COLL VENOUS BLD VENIPUNCTURE: CPT

## 2017-01-01 PROCEDURE — P9012 CRYOPRECIPITATE EACH UNIT: HCPCS

## 2017-01-01 PROCEDURE — 25000242 PHARM REV CODE 250 ALT 637 W/ HCPCS: Performed by: NURSE PRACTITIONER

## 2017-01-01 PROCEDURE — 80053 COMPREHEN METABOLIC PANEL: CPT

## 2017-01-01 PROCEDURE — 99223 1ST HOSP IP/OBS HIGH 75: CPT | Mod: ,,, | Performed by: SURGERY

## 2017-01-01 PROCEDURE — 83735 ASSAY OF MAGNESIUM: CPT | Mod: 91

## 2017-01-01 PROCEDURE — 25000003 PHARM REV CODE 250: Performed by: STUDENT IN AN ORGANIZED HEALTH CARE EDUCATION/TRAINING PROGRAM

## 2017-01-01 PROCEDURE — 25000242 PHARM REV CODE 250 ALT 637 W/ HCPCS: Performed by: PHYSICIAN ASSISTANT

## 2017-01-01 PROCEDURE — 82803 BLOOD GASES ANY COMBINATION: CPT

## 2017-01-01 PROCEDURE — 97166 OT EVAL MOD COMPLEX 45 MIN: CPT

## 2017-01-01 PROCEDURE — 99292 CRITICAL CARE ADDL 30 MIN: CPT | Mod: 25,,, | Performed by: PSYCHIATRY & NEUROLOGY

## 2017-01-01 PROCEDURE — 36620 INSERTION CATHETER ARTERY: CPT | Mod: ,,, | Performed by: NURSE PRACTITIONER

## 2017-01-01 PROCEDURE — 25500020 PHARM REV CODE 255

## 2017-01-01 PROCEDURE — 85025 COMPLETE CBC W/AUTO DIFF WBC: CPT

## 2017-01-01 PROCEDURE — 81001 URINALYSIS AUTO W/SCOPE: CPT

## 2017-01-01 PROCEDURE — 80162 ASSAY OF DIGOXIN TOTAL: CPT | Mod: 91

## 2017-01-01 PROCEDURE — 43235 EGD DIAGNOSTIC BRUSH WASH: CPT | Mod: GC,,, | Performed by: INTERNAL MEDICINE

## 2017-01-01 PROCEDURE — 80162 ASSAY OF DIGOXIN TOTAL: CPT

## 2017-01-01 PROCEDURE — 96375 TX/PRO/DX INJ NEW DRUG ADDON: CPT

## 2017-01-01 PROCEDURE — G8989 SELF CARE D/C STATUS: HCPCS | Mod: CN

## 2017-01-01 PROCEDURE — B317YZZ FLUOROSCOPY OF LEFT INTERNAL CAROTID ARTERY USING OTHER CONTRAST: ICD-10-PCS | Performed by: RADIOLOGY

## 2017-01-01 PROCEDURE — 99291 CRITICAL CARE FIRST HOUR: CPT | Mod: 25,,, | Performed by: PSYCHIATRY & NEUROLOGY

## 2017-01-01 PROCEDURE — 87070 CULTURE OTHR SPECIMN AEROBIC: CPT

## 2017-01-01 PROCEDURE — 25000003 PHARM REV CODE 250: Performed by: EMERGENCY MEDICINE

## 2017-01-01 PROCEDURE — 82600 ASSAY OF CYANIDE: CPT

## 2017-01-01 PROCEDURE — 95951 PR EEG MONITORING/VIDEORECORD: CPT | Mod: 26,52,, | Performed by: PSYCHIATRY & NEUROLOGY

## 2017-01-01 PROCEDURE — 84100 ASSAY OF PHOSPHORUS: CPT | Mod: 91

## 2017-01-01 PROCEDURE — 99900026 HC AIRWAY MAINTENANCE (STAT)

## 2017-01-01 PROCEDURE — 84145 PROCALCITONIN (PCT): CPT

## 2017-01-01 PROCEDURE — C9113 INJ PANTOPRAZOLE SODIUM, VIA: HCPCS | Performed by: NURSE PRACTITIONER

## 2017-01-01 PROCEDURE — 83605 ASSAY OF LACTIC ACID: CPT | Mod: 91

## 2017-01-01 PROCEDURE — 80299 QUANTITATIVE ASSAY DRUG: CPT

## 2017-01-01 PROCEDURE — 81000 URINALYSIS NONAUTO W/SCOPE: CPT

## 2017-01-01 PROCEDURE — 85018 HEMOGLOBIN: CPT

## 2017-01-01 PROCEDURE — 99285 EMERGENCY DEPT VISIT HI MDM: CPT | Mod: 25

## 2017-01-01 PROCEDURE — 94761 N-INVAS EAR/PLS OXIMETRY MLT: CPT

## 2017-01-01 PROCEDURE — 84484 ASSAY OF TROPONIN QUANT: CPT

## 2017-01-01 PROCEDURE — 0DJ08ZZ INSPECTION OF UPPER INTESTINAL TRACT, VIA NATURAL OR ARTIFICIAL OPENING ENDOSCOPIC: ICD-10-PCS | Performed by: INTERNAL MEDICINE

## 2017-01-01 PROCEDURE — 80048 BASIC METABOLIC PNL TOTAL CA: CPT

## 2017-01-01 PROCEDURE — 85384 FIBRINOGEN ACTIVITY: CPT | Mod: 91

## 2017-01-01 PROCEDURE — 37799 UNLISTED PX VASCULAR SURGERY: CPT

## 2017-01-01 PROCEDURE — 63600175 PHARM REV CODE 636 W HCPCS: Performed by: PHYSICIAN ASSISTANT

## 2017-01-01 PROCEDURE — 86900 BLOOD TYPING SEROLOGIC ABO: CPT

## 2017-01-01 PROCEDURE — 94003 VENT MGMT INPAT SUBQ DAY: CPT

## 2017-01-01 PROCEDURE — 99900035 HC TECH TIME PER 15 MIN (STAT)

## 2017-01-01 PROCEDURE — 82553 CREATINE MB FRACTION: CPT

## 2017-01-01 PROCEDURE — 43235 EGD DIAGNOSTIC BRUSH WASH: CPT | Performed by: INTERNAL MEDICINE

## 2017-01-01 PROCEDURE — 84100 ASSAY OF PHOSPHORUS: CPT

## 2017-01-01 PROCEDURE — 25000003 PHARM REV CODE 250: Performed by: PHYSICIAN ASSISTANT

## 2017-01-01 PROCEDURE — 93306 TTE W/DOPPLER COMPLETE: CPT | Mod: 26,,, | Performed by: INTERNAL MEDICINE

## 2017-01-01 PROCEDURE — 80061 LIPID PANEL: CPT | Mod: 91

## 2017-01-01 PROCEDURE — 99292 CRITICAL CARE ADDL 30 MIN: CPT | Mod: ,,, | Performed by: NURSE PRACTITIONER

## 2017-01-01 PROCEDURE — 97802 MEDICAL NUTRITION INDIV IN: CPT

## 2017-01-01 PROCEDURE — 96368 THER/DIAG CONCURRENT INF: CPT

## 2017-01-01 PROCEDURE — 87210 SMEAR WET MOUNT SALINE/INK: CPT

## 2017-01-01 PROCEDURE — 85025 COMPLETE CBC W/AUTO DIFF WBC: CPT | Mod: 91

## 2017-01-01 PROCEDURE — 99291 CRITICAL CARE FIRST HOUR: CPT | Mod: ,,, | Performed by: PSYCHIATRY & NEUROLOGY

## 2017-01-01 PROCEDURE — 36430 TRANSFUSION BLD/BLD COMPNT: CPT

## 2017-01-01 PROCEDURE — G8987 SELF CARE CURRENT STATUS: HCPCS | Mod: CN

## 2017-01-01 PROCEDURE — 03CG3ZZ EXTIRPATION OF MATTER FROM INTRACRANIAL ARTERY, PERCUTANEOUS APPROACH: ICD-10-PCS | Performed by: RADIOLOGY

## 2017-01-01 PROCEDURE — G8988 SELF CARE GOAL STATUS: HCPCS | Mod: CM

## 2017-01-01 PROCEDURE — 80329 ANALGESICS NON-OPIOID 1 OR 2: CPT

## 2017-01-01 PROCEDURE — 96374 THER/PROPH/DIAG INJ IV PUSH: CPT

## 2017-01-01 PROCEDURE — 86850 RBC ANTIBODY SCREEN: CPT

## 2017-01-01 PROCEDURE — 96365 THER/PROPH/DIAG IV INF INIT: CPT

## 2017-01-01 PROCEDURE — 82330 ASSAY OF CALCIUM: CPT

## 2017-01-01 PROCEDURE — 99222 1ST HOSP IP/OBS MODERATE 55: CPT | Mod: 25,GC,, | Performed by: INTERNAL MEDICINE

## 2017-01-01 PROCEDURE — 02HV33Z INSERTION OF INFUSION DEVICE INTO SUPERIOR VENA CAVA, PERCUTANEOUS APPROACH: ICD-10-PCS | Performed by: PSYCHIATRY & NEUROLOGY

## 2017-01-01 PROCEDURE — 94002 VENT MGMT INPAT INIT DAY: CPT

## 2017-01-01 PROCEDURE — 99291 CRITICAL CARE FIRST HOUR: CPT | Mod: 25,GC,, | Performed by: PSYCHIATRY & NEUROLOGY

## 2017-01-01 PROCEDURE — 94640 AIRWAY INHALATION TREATMENT: CPT

## 2017-01-01 PROCEDURE — 99233 SBSQ HOSP IP/OBS HIGH 50: CPT | Mod: ,,, | Performed by: PSYCHIATRY & NEUROLOGY

## 2017-01-01 PROCEDURE — 25000003 PHARM REV CODE 250: Performed by: RADIOLOGY

## 2017-01-01 PROCEDURE — 63600175 PHARM REV CODE 636 W HCPCS

## 2017-01-01 PROCEDURE — 84443 ASSAY THYROID STIM HORMONE: CPT | Mod: 91

## 2017-01-01 RX ORDER — ALBUTEROL SULFATE 0.83 MG/ML
2.5 SOLUTION RESPIRATORY (INHALATION) ONCE
Status: COMPLETED | OUTPATIENT
Start: 2017-01-01 | End: 2017-01-01

## 2017-01-01 RX ORDER — DILTIAZEM HCL 1 MG/ML
5 INJECTION, SOLUTION INTRAVENOUS CONTINUOUS
Status: DISCONTINUED | OUTPATIENT
Start: 2017-01-01 | End: 2017-01-01

## 2017-01-01 RX ORDER — METOPROLOL SUCCINATE 50 MG/1
50 TABLET, EXTENDED RELEASE ORAL DAILY
Status: ON HOLD | COMMUNITY
End: 2017-01-01 | Stop reason: CLARIF

## 2017-01-01 RX ORDER — GLUCAGON 1 MG
1 KIT INJECTION
Status: DISCONTINUED | OUTPATIENT
Start: 2017-01-01 | End: 2017-12-14 | Stop reason: HOSPADM

## 2017-01-01 RX ORDER — PANTOPRAZOLE SODIUM 40 MG/1
TABLET, DELAYED RELEASE ORAL
Refills: 0 | COMMUNITY
Start: 2016-01-01

## 2017-01-01 RX ORDER — HYDROCHLOROTHIAZIDE 25 MG/1
TABLET ORAL
Refills: 1 | Status: ON HOLD | COMMUNITY
Start: 2017-01-01 | End: 2017-01-01 | Stop reason: CLARIF

## 2017-01-01 RX ORDER — FENTANYL CITRATE 50 UG/ML
INJECTION, SOLUTION INTRAMUSCULAR; INTRAVENOUS
Status: COMPLETED
Start: 2017-01-01 | End: 2017-01-01

## 2017-01-01 RX ORDER — EPINEPHRINE 1 MG/ML
INJECTION INTRAMUSCULAR; INTRAVENOUS; SUBCUTANEOUS CODE/TRAUMA/SEDATION MEDICATION
Status: COMPLETED | OUTPATIENT
Start: 2017-01-01 | End: 2017-01-01

## 2017-01-01 RX ORDER — SODIUM BICARBONATE 1 MEQ/ML
SYRINGE (ML) INTRAVENOUS CODE/TRAUMA/SEDATION MEDICATION
Status: COMPLETED | OUTPATIENT
Start: 2017-01-01 | End: 2017-01-01

## 2017-01-01 RX ORDER — SODIUM BICARBONATE 42 MG/ML
150 INJECTION, SOLUTION INTRAVENOUS ONCE
Status: DISCONTINUED | OUTPATIENT
Start: 2017-01-01 | End: 2017-01-01

## 2017-01-01 RX ORDER — SODIUM BICARBONATE 1 MEQ/ML
100 SYRINGE (ML) INTRAVENOUS ONCE
Status: COMPLETED | OUTPATIENT
Start: 2017-01-01 | End: 2017-01-01

## 2017-01-01 RX ORDER — LEVETIRACETAM 5 MG/ML
500 INJECTION INTRAVASCULAR EVERY 12 HOURS
Status: DISCONTINUED | OUTPATIENT
Start: 2017-01-01 | End: 2017-12-14 | Stop reason: HOSPADM

## 2017-01-01 RX ORDER — METOPROLOL TARTRATE 1 MG/ML
5 INJECTION, SOLUTION INTRAVENOUS ONCE
Status: COMPLETED | OUTPATIENT
Start: 2017-01-01 | End: 2017-01-01

## 2017-01-01 RX ORDER — PHENYLEPHRINE HYDROCHLORIDE 10 MG/ML
INJECTION INTRAVENOUS
Status: DISPENSED
Start: 2017-01-01 | End: 2017-01-01

## 2017-01-01 RX ORDER — POTASSIUM CHLORIDE 7.45 MG/ML
10 INJECTION INTRAVENOUS
Status: COMPLETED | OUTPATIENT
Start: 2017-01-01 | End: 2017-01-01

## 2017-01-01 RX ORDER — SODIUM BICARBONATE 1 MEQ/ML
150 SYRINGE (ML) INTRAVENOUS ONCE
Status: COMPLETED | OUTPATIENT
Start: 2017-01-01 | End: 2017-01-01

## 2017-01-01 RX ORDER — DICYCLOMINE HYDROCHLORIDE 10 MG/1
20 CAPSULE ORAL
Status: COMPLETED | OUTPATIENT
Start: 2017-01-01 | End: 2017-01-01

## 2017-01-01 RX ORDER — INSULIN ASPART 100 [IU]/ML
0-5 INJECTION, SOLUTION INTRAVENOUS; SUBCUTANEOUS EVERY 6 HOURS PRN
Status: DISCONTINUED | OUTPATIENT
Start: 2017-01-01 | End: 2017-01-01

## 2017-01-01 RX ORDER — PANTOPRAZOLE SODIUM 40 MG/10ML
40 INJECTION, POWDER, LYOPHILIZED, FOR SOLUTION INTRAVENOUS 2 TIMES DAILY
Status: DISCONTINUED | OUTPATIENT
Start: 2017-01-01 | End: 2017-12-14 | Stop reason: HOSPADM

## 2017-01-01 RX ORDER — ATROPINE SULFATE 0.4 MG/ML
INJECTION, SOLUTION ENDOTRACHEAL; INTRAMEDULLARY; INTRAMUSCULAR; INTRAVENOUS; SUBCUTANEOUS
Status: DISCONTINUED
Start: 2017-01-01 | End: 2017-12-14 | Stop reason: HOSPADM

## 2017-01-01 RX ORDER — CALCIUM CHLORIDE INJECTION 100 MG/ML
1 INJECTION, SOLUTION INTRAVENOUS ONCE
Status: COMPLETED | OUTPATIENT
Start: 2017-01-01 | End: 2017-01-01

## 2017-01-01 RX ORDER — PANTOPRAZOLE SODIUM 40 MG/10ML
40 INJECTION, POWDER, LYOPHILIZED, FOR SOLUTION INTRAVENOUS 2 TIMES DAILY
Status: DISCONTINUED | OUTPATIENT
Start: 2017-01-01 | End: 2017-01-01

## 2017-01-01 RX ORDER — NICARDIPINE HYDROCHLORIDE 0.2 MG/ML
5 INJECTION INTRAVENOUS CONTINUOUS
Status: DISCONTINUED | OUTPATIENT
Start: 2017-01-01 | End: 2017-01-01

## 2017-01-01 RX ORDER — DEXTROSE MONOHYDRATE 50 MG/ML
INJECTION, SOLUTION INTRAVENOUS
Status: DISCONTINUED
Start: 2017-01-01 | End: 2017-12-14 | Stop reason: HOSPADM

## 2017-01-01 RX ORDER — METHIMAZOLE 5 MG/1
10 TABLET ORAL 2 TIMES DAILY
Status: DISCONTINUED | OUTPATIENT
Start: 2017-01-01 | End: 2017-12-14 | Stop reason: HOSPADM

## 2017-01-01 RX ORDER — HYDROCODONE BITARTRATE AND ACETAMINOPHEN 5; 325 MG/1; MG/1
1 TABLET ORAL EVERY 6 HOURS PRN
Qty: 40 TABLET | Refills: 0
Start: 2017-01-01 | End: 2017-01-01

## 2017-01-01 RX ORDER — METOPROLOL SUCCINATE 50 MG/1
TABLET, EXTENDED RELEASE ORAL
COMMUNITY
Start: 2017-01-01 | End: 2017-01-01

## 2017-01-01 RX ORDER — FAMOTIDINE 20 MG/1
20 TABLET, FILM COATED ORAL 2 TIMES DAILY
Status: DISCONTINUED | OUTPATIENT
Start: 2017-01-01 | End: 2017-01-01

## 2017-01-01 RX ORDER — AMLODIPINE BESYLATE 5 MG/1
TABLET ORAL
Refills: 1 | COMMUNITY
Start: 2017-01-01 | End: 2017-01-01

## 2017-01-01 RX ORDER — DEXTROSE MONOHYDRATE 25 G/50ML
INJECTION, SOLUTION INTRAVENOUS
Status: COMPLETED | OUTPATIENT
Start: 2017-01-01 | End: 2017-01-01

## 2017-01-01 RX ORDER — DILTIAZEM HYDROCHLORIDE 60 MG/1
60 TABLET, FILM COATED ORAL EVERY 6 HOURS
Status: DISCONTINUED | OUTPATIENT
Start: 2017-01-01 | End: 2017-01-01

## 2017-01-01 RX ORDER — SODIUM BICARBONATE 1 MEQ/ML
150 SYRINGE (ML) INTRAVENOUS ONCE
Status: DISCONTINUED | OUTPATIENT
Start: 2017-12-14 | End: 2017-12-14 | Stop reason: HOSPADM

## 2017-01-01 RX ORDER — POLYETHYLENE GLYCOL 3350 17 G/17G
17 POWDER, FOR SOLUTION ORAL DAILY
Qty: 10 EACH | Refills: 0 | Status: ON HOLD | OUTPATIENT
Start: 2017-01-01 | End: 2017-01-01 | Stop reason: CLARIF

## 2017-01-01 RX ORDER — ATORVASTATIN CALCIUM 20 MG/1
40 TABLET, FILM COATED ORAL DAILY
Status: DISCONTINUED | OUTPATIENT
Start: 2017-01-01 | End: 2017-01-01

## 2017-01-01 RX ORDER — HYDROCODONE BITARTRATE AND ACETAMINOPHEN 10; 325 MG/1; MG/1
1 TABLET ORAL 2 TIMES DAILY PRN
Refills: 0 | COMMUNITY
Start: 2017-01-01 | End: 2017-01-01

## 2017-01-01 RX ORDER — SODIUM BICARBONATE 1 MEQ/ML
SYRINGE (ML) INTRAVENOUS
Status: DISPENSED
Start: 2017-01-01 | End: 2017-01-01

## 2017-01-01 RX ORDER — SODIUM CHLORIDE 9 MG/ML
INJECTION, SOLUTION INTRAVENOUS CONTINUOUS
Status: DISCONTINUED | OUTPATIENT
Start: 2017-01-01 | End: 2017-01-01

## 2017-01-01 RX ORDER — SODIUM BICARBONATE 1 MEQ/ML
200 SYRINGE (ML) INTRAVENOUS ONCE
Status: COMPLETED | OUTPATIENT
Start: 2017-01-01 | End: 2017-01-01

## 2017-01-01 RX ORDER — HYDROCODONE BITARTRATE AND ACETAMINOPHEN 500; 5 MG/1; MG/1
TABLET ORAL
Status: DISCONTINUED | OUTPATIENT
Start: 2017-01-01 | End: 2017-12-14 | Stop reason: HOSPADM

## 2017-01-01 RX ORDER — SODIUM CHLORIDE 0.9 % (FLUSH) 0.9 %
5 SYRINGE (ML) INJECTION
Status: DISCONTINUED | OUTPATIENT
Start: 2017-01-01 | End: 2017-12-14 | Stop reason: HOSPADM

## 2017-01-01 RX ORDER — METOPROLOL TARTRATE 1 MG/ML
INJECTION, SOLUTION INTRAVENOUS
Status: COMPLETED
Start: 2017-01-01 | End: 2017-01-01

## 2017-01-01 RX ORDER — LEVETIRACETAM 10 MG/ML
1000 INJECTION INTRAVASCULAR
Status: COMPLETED | OUTPATIENT
Start: 2017-01-01 | End: 2017-01-01

## 2017-01-01 RX ORDER — SODIUM BICARBONATE 1 MEQ/ML
SYRINGE (ML) INTRAVENOUS
Status: DISCONTINUED
Start: 2017-01-01 | End: 2017-12-14 | Stop reason: HOSPADM

## 2017-01-01 RX ORDER — FENTANYL CITRATE 50 UG/ML
INJECTION, SOLUTION INTRAMUSCULAR; INTRAVENOUS
Status: DISPENSED
Start: 2017-01-01 | End: 2017-01-01

## 2017-01-01 RX ORDER — FUROSEMIDE 10 MG/ML
40 INJECTION INTRAMUSCULAR; INTRAVENOUS ONCE
Status: DISCONTINUED | OUTPATIENT
Start: 2017-12-14 | End: 2017-12-14 | Stop reason: HOSPADM

## 2017-01-01 RX ORDER — ASPIRIN 81 MG/1
81 TABLET ORAL DAILY
Status: DISCONTINUED | OUTPATIENT
Start: 2017-01-01 | End: 2017-01-01

## 2017-01-01 RX ORDER — APIXABAN 5 MG/1
TABLET, FILM COATED ORAL
Refills: 0 | COMMUNITY
Start: 2016-01-01

## 2017-01-01 RX ORDER — AMIODARONE HYDROCHLORIDE 200 MG/1
200 TABLET ORAL 2 TIMES DAILY
Status: DISCONTINUED | OUTPATIENT
Start: 2017-01-01 | End: 2017-01-01

## 2017-01-01 RX ORDER — SODIUM CHLORIDE 0.9 % (FLUSH) 0.9 %
3 SYRINGE (ML) INJECTION EVERY 8 HOURS
Status: DISCONTINUED | OUTPATIENT
Start: 2017-01-01 | End: 2017-12-14 | Stop reason: HOSPADM

## 2017-01-01 RX ORDER — MORPHINE SULFATE 4 MG/ML
4 INJECTION, SOLUTION INTRAMUSCULAR; INTRAVENOUS
Status: COMPLETED | OUTPATIENT
Start: 2017-01-01 | End: 2017-01-01

## 2017-01-01 RX ORDER — NICARDIPINE HYDROCHLORIDE 0.2 MG/ML
2.5 INJECTION INTRAVENOUS CONTINUOUS
Status: DISCONTINUED | OUTPATIENT
Start: 2017-01-01 | End: 2017-01-01

## 2017-01-01 RX ORDER — DEXTROSE MONOHYDRATE 25 G/50ML
INJECTION, SOLUTION INTRAVENOUS
Status: DISCONTINUED
Start: 2017-01-01 | End: 2017-12-14 | Stop reason: HOSPADM

## 2017-01-01 RX ORDER — METOPROLOL TARTRATE 1 MG/ML
INJECTION, SOLUTION INTRAVENOUS
Status: DISPENSED
Start: 2017-01-01 | End: 2017-01-01

## 2017-01-01 RX ORDER — LEVETIRACETAM 10 MG/ML
1000 INJECTION INTRAVASCULAR
Status: DISCONTINUED | OUTPATIENT
Start: 2017-01-01 | End: 2017-01-01

## 2017-01-01 RX ORDER — PHENYLEPHRINE HCL IN 0.9% NACL 1 MG/10 ML
SYRINGE (ML) INTRAVENOUS
Status: DISPENSED
Start: 2017-01-01 | End: 2017-01-01

## 2017-01-01 RX ORDER — PROPRANOLOL HYDROCHLORIDE 10 MG/1
30 TABLET ORAL 3 TIMES DAILY
COMMUNITY

## 2017-01-01 RX ORDER — ONDANSETRON 2 MG/ML
4 INJECTION INTRAMUSCULAR; INTRAVENOUS
Status: COMPLETED | OUTPATIENT
Start: 2017-01-01 | End: 2017-01-01

## 2017-01-01 RX ORDER — CALCIUM CHLORIDE INJECTION 100 MG/ML
1 INJECTION, SOLUTION INTRAVENOUS ONCE
Status: DISCONTINUED | OUTPATIENT
Start: 2017-12-14 | End: 2017-12-14 | Stop reason: HOSPADM

## 2017-01-01 RX ORDER — CALCIUM CHLORIDE INJECTION 100 MG/ML
INJECTION, SOLUTION INTRAVENOUS
Status: DISCONTINUED
Start: 2017-01-01 | End: 2017-12-14 | Stop reason: HOSPADM

## 2017-01-01 RX ORDER — ATORVASTATIN CALCIUM 20 MG/1
80 TABLET, FILM COATED ORAL DAILY
Status: DISCONTINUED | OUTPATIENT
Start: 2017-01-01 | End: 2017-01-01

## 2017-01-01 RX ORDER — INDOMETHACIN 25 MG/1
150 CAPSULE ORAL ONCE
Status: DISCONTINUED | OUTPATIENT
Start: 2017-01-01 | End: 2017-01-01

## 2017-01-01 RX ORDER — FOLIC ACID 1 MG/1
1 TABLET ORAL DAILY
COMMUNITY

## 2017-01-01 RX ORDER — AMLODIPINE BESYLATE 5 MG/1
5 TABLET ORAL DAILY
Status: ON HOLD | COMMUNITY
End: 2017-01-01 | Stop reason: CLARIF

## 2017-01-01 RX ORDER — CHLORHEXIDINE GLUCONATE ORAL RINSE 1.2 MG/ML
15 SOLUTION DENTAL 2 TIMES DAILY
Status: DISCONTINUED | OUTPATIENT
Start: 2017-01-01 | End: 2017-12-14 | Stop reason: HOSPADM

## 2017-01-01 RX ORDER — POTASSIUM CHLORIDE 7.45 MG/ML
10 INJECTION INTRAVENOUS ONCE
Status: COMPLETED | OUTPATIENT
Start: 2017-01-01 | End: 2017-01-01

## 2017-01-01 RX ORDER — DIPHENHYDRAMINE HYDROCHLORIDE 50 MG/ML
12.5 INJECTION INTRAMUSCULAR; INTRAVENOUS
Status: COMPLETED | OUTPATIENT
Start: 2017-01-01 | End: 2017-01-01

## 2017-01-01 RX ORDER — ALBUTEROL SULFATE 0.83 MG/ML
10 SOLUTION RESPIRATORY (INHALATION) ONCE
Status: COMPLETED | OUTPATIENT
Start: 2017-01-01 | End: 2017-01-01

## 2017-01-01 RX ORDER — DILTIAZEM HYDROCHLORIDE 5 MG/ML
10 INJECTION INTRAVENOUS
Status: COMPLETED | OUTPATIENT
Start: 2017-01-01 | End: 2017-01-01

## 2017-01-01 RX ORDER — SODIUM BICARBONATE 1 MEQ/ML
SYRINGE (ML) INTRAVENOUS
Status: DISPENSED
Start: 2017-01-01 | End: 2017-12-14

## 2017-01-01 RX ORDER — METOCLOPRAMIDE HYDROCHLORIDE 5 MG/ML
10 INJECTION INTRAMUSCULAR; INTRAVENOUS ONCE
Status: COMPLETED | OUTPATIENT
Start: 2017-01-01 | End: 2017-01-01

## 2017-01-01 RX ORDER — HYDROCODONE BITARTRATE AND ACETAMINOPHEN 500; 5 MG/1; MG/1
TABLET ORAL
Status: DISCONTINUED | OUTPATIENT
Start: 2017-01-01 | End: 2017-01-01

## 2017-01-01 RX ORDER — VASOPRESSIN 20 [USP'U]/ML
INJECTION, SOLUTION INTRAMUSCULAR; SUBCUTANEOUS
Status: DISPENSED
Start: 2017-01-01 | End: 2017-01-01

## 2017-01-01 RX ORDER — METRONIDAZOLE 500 MG/1
500 TABLET ORAL 3 TIMES DAILY
Qty: 21 TABLET | Refills: 0 | Status: SHIPPED | OUTPATIENT
Start: 2017-01-01 | End: 2017-01-01

## 2017-01-01 RX ORDER — INSULIN ASPART 100 [IU]/ML
10 INJECTION, SOLUTION INTRAVENOUS; SUBCUTANEOUS ONCE
Status: DISCONTINUED | OUTPATIENT
Start: 2017-01-01 | End: 2017-12-14 | Stop reason: HOSPADM

## 2017-01-01 RX ORDER — DIGOXIN 125 MCG
125 TABLET ORAL DAILY
COMMUNITY

## 2017-01-01 RX ORDER — ATROPINE SULFATE 0.4 MG/ML
INJECTION, SOLUTION ENDOTRACHEAL; INTRAMEDULLARY; INTRAMUSCULAR; INTRAVENOUS; SUBCUTANEOUS
Status: DISPENSED
Start: 2017-01-01 | End: 2017-01-01

## 2017-01-01 RX ORDER — DIGOXIN 125 MCG
0.12 TABLET ORAL DAILY
Status: DISCONTINUED | OUTPATIENT
Start: 2017-01-01 | End: 2017-01-01

## 2017-01-01 RX ORDER — SPIRONOLACTONE 25 MG/1
25 TABLET ORAL DAILY
COMMUNITY

## 2017-01-01 RX ORDER — CITALOPRAM 10 MG/1
TABLET ORAL
Refills: 3 | COMMUNITY
Start: 2017-01-01 | End: 2017-01-01

## 2017-01-01 RX ORDER — DILTIAZEM HYDROCHLORIDE 240 MG/1
240 CAPSULE, COATED, EXTENDED RELEASE ORAL DAILY
COMMUNITY

## 2017-01-01 RX ORDER — METHIMAZOLE 10 MG/1
10 TABLET ORAL 2 TIMES DAILY
COMMUNITY

## 2017-01-01 RX ORDER — INSULIN ASPART 100 [IU]/ML
1-10 INJECTION, SOLUTION INTRAVENOUS; SUBCUTANEOUS EVERY 6 HOURS PRN
Status: DISCONTINUED | OUTPATIENT
Start: 2017-01-01 | End: 2017-12-14 | Stop reason: HOSPADM

## 2017-01-01 RX ORDER — FUROSEMIDE 10 MG/ML
INJECTION INTRAMUSCULAR; INTRAVENOUS
Status: DISCONTINUED
Start: 2017-01-01 | End: 2017-12-14 | Stop reason: HOSPADM

## 2017-01-01 RX ORDER — FENTANYL CITRAT/DEXTROSE 5%/PF 100 MCG/10
PATIENT CONTROLLED ANALGESIA SYRINGE INTRAVENOUS CONTINUOUS
Status: DISCONTINUED | OUTPATIENT
Start: 2017-01-01 | End: 2017-01-01

## 2017-01-01 RX ORDER — GLUCAGON 1 MG
1 KIT INJECTION
Status: DISCONTINUED | OUTPATIENT
Start: 2017-01-01 | End: 2017-01-01

## 2017-01-01 RX ORDER — NOREPINEPHRINE BITARTRATE/D5W 4MG/250ML
0.02 PLASTIC BAG, INJECTION (ML) INTRAVENOUS CONTINUOUS
Status: DISCONTINUED | OUTPATIENT
Start: 2017-01-01 | End: 2017-01-01

## 2017-01-01 RX ORDER — ISOSORBIDE MONONITRATE 30 MG/1
30 TABLET, EXTENDED RELEASE ORAL DAILY
Status: ON HOLD | COMMUNITY
End: 2017-01-01 | Stop reason: CLARIF

## 2017-01-01 RX ORDER — SODIUM BICARBONATE 42 MG/ML
100 INJECTION, SOLUTION INTRAVENOUS ONCE
Status: DISCONTINUED | OUTPATIENT
Start: 2017-01-01 | End: 2017-01-01

## 2017-01-01 RX ORDER — SODIUM BICARBONATE 1 MEQ/ML
150 SYRINGE (ML) INTRAVENOUS ONCE
Status: DISCONTINUED | OUTPATIENT
Start: 2017-01-01 | End: 2017-01-01

## 2017-01-01 RX ORDER — SERTRALINE HYDROCHLORIDE 50 MG/1
50 TABLET, FILM COATED ORAL DAILY
COMMUNITY

## 2017-01-01 RX ADMIN — METOROPROLOL TARTRATE 5 MG: 5 INJECTION, SOLUTION INTRAVENOUS at 05:12

## 2017-01-01 RX ADMIN — METOROPROLOL TARTRATE 5 MG: 5 INJECTION, SOLUTION INTRAVENOUS at 02:12

## 2017-01-01 RX ADMIN — SODIUM BICARBONATE 50 MEQ: 84 INJECTION, SOLUTION INTRAVENOUS at 08:12

## 2017-01-01 RX ADMIN — VANCOMYCIN HYDROCHLORIDE 750 MG: 750 INJECTION, POWDER, LYOPHILIZED, FOR SOLUTION INTRAVENOUS at 09:12

## 2017-01-01 RX ADMIN — SODIUM BICARBONATE 100 MEQ: 84 INJECTION INTRAVENOUS at 09:12

## 2017-01-01 RX ADMIN — METHIMAZOLE 10 MG: 5 TABLET ORAL at 11:12

## 2017-01-01 RX ADMIN — MORPHINE SULFATE 4 MG: 4 INJECTION INTRAVENOUS at 08:03

## 2017-01-01 RX ADMIN — ONDANSETRON 4 MG: 2 INJECTION INTRAMUSCULAR; INTRAVENOUS at 07:03

## 2017-01-01 RX ADMIN — INSULIN HUMAN 10 UNITS: 100 INJECTION, SOLUTION PARENTERAL at 08:12

## 2017-01-01 RX ADMIN — LEVETIRACETAM 500 MG: 5 INJECTION INTRAVENOUS at 09:12

## 2017-01-01 RX ADMIN — VANCOMYCIN HYDROCHLORIDE 750 MG: 750 INJECTION, POWDER, LYOPHILIZED, FOR SOLUTION INTRAVENOUS at 06:12

## 2017-01-01 RX ADMIN — METOROPROLOL TARTRATE 5 MG: 5 INJECTION, SOLUTION INTRAVENOUS at 01:12

## 2017-01-01 RX ADMIN — PANTOPRAZOLE SODIUM 40 MG: 40 INJECTION, POWDER, FOR SOLUTION INTRAVENOUS at 12:12

## 2017-01-01 RX ADMIN — SODIUM CHLORIDE 1 UNITS/HR: 9 INJECTION, SOLUTION INTRAVENOUS at 12:12

## 2017-01-01 RX ADMIN — Medication 3 ML: at 02:12

## 2017-01-01 RX ADMIN — CHLORHEXIDINE GLUCONATE 15 ML: 1.2 RINSE ORAL at 11:12

## 2017-01-01 RX ADMIN — DIGOXIN 0.12 MG: 0.12 TABLET ORAL at 11:12

## 2017-01-01 RX ADMIN — SODIUM BICARBONATE 150 MEQ: 84 INJECTION, SOLUTION INTRAVENOUS at 03:12

## 2017-01-01 RX ADMIN — POTASSIUM CHLORIDE 10 MEQ: 10 INJECTION, SOLUTION INTRAVENOUS at 04:12

## 2017-01-01 RX ADMIN — DILTIAZEM HYDROCHLORIDE 10 MG: 5 INJECTION INTRAVENOUS at 03:12

## 2017-01-01 RX ADMIN — NICARDIPINE HYDROCHLORIDE 7.5 MG/HR: 0.2 INJECTION, SOLUTION INTRAVENOUS at 06:12

## 2017-01-01 RX ADMIN — LEVETIRACETAM 500 MG: 5 INJECTION INTRAVENOUS at 02:12

## 2017-01-01 RX ADMIN — POTASSIUM CHLORIDE 10 MEQ: 10 INJECTION, SOLUTION INTRAVENOUS at 11:12

## 2017-01-01 RX ADMIN — PANTOPRAZOLE SODIUM 40 MG: 40 INJECTION, POWDER, FOR SOLUTION INTRAVENOUS at 11:12

## 2017-01-01 RX ADMIN — DICYCLOMINE HYDROCHLORIDE 20 MG: 10 CAPSULE ORAL at 09:03

## 2017-01-01 RX ADMIN — AMIODARONE HYDROCHLORIDE 200 MG: 200 TABLET ORAL at 03:12

## 2017-01-01 RX ADMIN — POTASSIUM CHLORIDE 10 MEQ: 10 INJECTION, SOLUTION INTRAVENOUS at 02:12

## 2017-01-01 RX ADMIN — IOHEXOL 75 ML: 350 INJECTION, SOLUTION INTRAVENOUS at 09:03

## 2017-01-01 RX ADMIN — EPINEPHRINE 1 MG: 1 INJECTION INTRAMUSCULAR; INTRAVENOUS; SUBCUTANEOUS at 08:12

## 2017-01-01 RX ADMIN — VASOPRESSIN 0.04 UNITS/MIN: 20 INJECTION INTRAVENOUS at 09:12

## 2017-01-01 RX ADMIN — PANTOPRAZOLE SODIUM 40 MG: 40 INJECTION, POWDER, FOR SOLUTION INTRAVENOUS at 09:12

## 2017-01-01 RX ADMIN — DEXTROSE MONOHYDRATE 0.02 MCG/KG/MIN: 5 INJECTION, SOLUTION INTRAVENOUS at 04:12

## 2017-01-01 RX ADMIN — METOCLOPRAMIDE 10 MG: 5 INJECTION, SOLUTION INTRAMUSCULAR; INTRAVENOUS at 02:12

## 2017-01-01 RX ADMIN — CALCIUM CHLORIDE 1 G: 100 INJECTION INTRAVENOUS; INTRAVENTRICULAR at 09:12

## 2017-01-01 RX ADMIN — THIAMINE HYDROCHLORIDE 100 MG: 100 INJECTION, SOLUTION INTRAMUSCULAR; INTRAVENOUS at 03:12

## 2017-01-01 RX ADMIN — SODIUM BICARBONATE 100 ML/HR: 84 INJECTION, SOLUTION INTRAVENOUS at 10:12

## 2017-01-01 RX ADMIN — LEVETIRACETAM 500 MG: 5 INJECTION INTRAVENOUS at 12:12

## 2017-01-01 RX ADMIN — SODIUM BICARBONATE 200 MEQ: 84 INJECTION, SOLUTION INTRAVENOUS at 09:12

## 2017-01-01 RX ADMIN — SODIUM BICARBONATE 150 MEQ: 84 INJECTION, SOLUTION INTRAVENOUS at 05:12

## 2017-01-01 RX ADMIN — DIPHENHYDRAMINE HYDROCHLORIDE 12.5 MG: 50 INJECTION, SOLUTION INTRAMUSCULAR; INTRAVENOUS at 09:03

## 2017-01-01 RX ADMIN — SODIUM BICARBONATE 150 MEQ: 84 INJECTION, SOLUTION INTRAVENOUS at 01:12

## 2017-01-01 RX ADMIN — SODIUM BICARBONATE 100 MEQ: 84 INJECTION, SOLUTION INTRAVENOUS at 10:12

## 2017-01-01 RX ADMIN — SODIUM BICARBONATE 150 MEQ: 84 INJECTION INTRAVENOUS at 07:12

## 2017-01-01 RX ADMIN — Medication 3 ML: at 10:12

## 2017-01-01 RX ADMIN — PIPERACILLIN AND TAZOBACTAM 4.5 G: 4; .5 INJECTION, POWDER, LYOPHILIZED, FOR SOLUTION INTRAVENOUS; PARENTERAL at 09:12

## 2017-01-01 RX ADMIN — SODIUM CHLORIDE, SODIUM LACTATE, POTASSIUM CHLORIDE, AND CALCIUM CHLORIDE 1000 ML: .6; .31; .03; .02 INJECTION, SOLUTION INTRAVENOUS at 09:12

## 2017-01-01 RX ADMIN — DILTIAZEM HYDROCHLORIDE 60 MG: 60 TABLET, FILM COATED ORAL at 11:12

## 2017-01-01 RX ADMIN — Medication 3 ML: at 01:12

## 2017-01-01 RX ADMIN — FAMOTIDINE 20 MG: 20 TABLET, FILM COATED ORAL at 09:12

## 2017-01-01 RX ADMIN — SODIUM BICARBONATE 100 MEQ: 84 INJECTION, SOLUTION INTRAVENOUS at 04:12

## 2017-01-01 RX ADMIN — DEXTROSE MONOHYDRATE 25 G: 25 INJECTION, SOLUTION INTRAVENOUS at 09:12

## 2017-01-01 RX ADMIN — DILTIAZEM HYDROCHLORIDE 5 MG/HR: 5 INJECTION INTRAVENOUS at 03:12

## 2017-01-01 RX ADMIN — DEXTROSE MONOHYDRATE 1 MCG/KG/MIN: 5 INJECTION, SOLUTION INTRAVENOUS at 11:12

## 2017-01-01 RX ADMIN — PIPERACILLIN AND TAZOBACTAM 4.5 G: 4; .5 INJECTION, POWDER, LYOPHILIZED, FOR SOLUTION INTRAVENOUS; PARENTERAL at 02:12

## 2017-01-01 RX ADMIN — SODIUM CHLORIDE 500 ML: 0.9 INJECTION, SOLUTION INTRAVENOUS at 04:12

## 2017-01-01 RX ADMIN — ALBUTEROL SULFATE 10 MG: 2.5 SOLUTION RESPIRATORY (INHALATION) at 09:12

## 2017-01-01 RX ADMIN — Medication 1000 MG: at 03:12

## 2017-01-01 RX ADMIN — SODIUM BICARBONATE 150 MEQ: 84 INJECTION INTRAVENOUS at 09:12

## 2017-01-01 RX ADMIN — Medication 0.6 MCG/KG/MIN: at 12:12

## 2017-01-01 RX ADMIN — DEXTROSE MONOHYDRATE 50 ML: 25 INJECTION, SOLUTION INTRAVENOUS at 08:12

## 2017-01-01 RX ADMIN — SODIUM POLYSTYRENE SULFONATE 45 G: 15 SUSPENSION ORAL; RECTAL at 09:12

## 2017-01-01 RX ADMIN — SODIUM BICARBONATE 100 MEQ: 84 INJECTION, SOLUTION INTRAVENOUS at 01:12

## 2017-01-01 RX ADMIN — INSULIN HUMAN 15 UNITS: 100 INJECTION, SOLUTION PARENTERAL at 09:12

## 2017-01-01 RX ADMIN — POTASSIUM CHLORIDE 10 MEQ: 10 INJECTION, SOLUTION INTRAVENOUS at 01:12

## 2017-01-01 RX ADMIN — Medication 1 MCG/KG/MIN: at 09:12

## 2017-01-01 RX ADMIN — ALBUTEROL SULFATE 2.5 MG: 2.5 SOLUTION RESPIRATORY (INHALATION) at 08:12

## 2017-01-01 RX ADMIN — DEXTROSE MONOHYDRATE 1 G: 5 INJECTION, SOLUTION INTRAVENOUS at 09:12

## 2017-01-01 RX ADMIN — DEXTROSE MONOHYDRATE 12.5 G: 25 INJECTION, SOLUTION INTRAVENOUS at 03:12

## 2017-01-01 RX ADMIN — FENTANYL CITRATE 100 MCG: 50 INJECTION, SOLUTION INTRAMUSCULAR; INTRAVENOUS at 01:12

## 2017-01-01 RX ADMIN — SODIUM CHLORIDE: 0.9 INJECTION, SOLUTION INTRAVENOUS at 11:12

## 2017-01-01 RX ADMIN — PIPERACILLIN AND TAZOBACTAM 4.5 G: 4; .5 INJECTION, POWDER, LYOPHILIZED, FOR SOLUTION INTRAVENOUS; PARENTERAL at 03:12

## 2017-01-01 RX ADMIN — LEVETIRACETAM 1000 MG: 10 INJECTION INTRAVENOUS at 03:12

## 2017-01-01 RX ADMIN — FENTANYL CITRATE 100 MCG: 50 INJECTION, SOLUTION INTRAMUSCULAR; INTRAVENOUS at 02:12

## 2017-01-01 RX ADMIN — EPINEPHRINE 1 MG: 1 INJECTION INTRAMUSCULAR; INTRAVENOUS; SUBCUTANEOUS at 07:12

## 2017-01-01 RX ADMIN — DILTIAZEM HYDROCHLORIDE 15 MG/HR: 5 INJECTION INTRAVENOUS at 12:12

## 2017-02-08 PROBLEM — Z98.890 S/P CAROTID ENDARTERECTOMY: Status: ACTIVE | Noted: 2017-01-01

## 2017-02-08 NOTE — PROGRESS NOTES
HISTORY OF PRESENT ILLNESS:  This is a 51-year-old lady with severe carotid   disease.  She had a left carotid endarterectomy in the recent past.  She comes   back to the office today in followup.  Medicines are noted upon recent EPIC   record.  Her problem list is reviewed.  She is on daily aspirin.  The surgical   wound is clean and dry.  There is no evidence of infection.    PHYSICAL EXAMINATION:  Her neurologic exam is unremarkable.    PLAN:  I have refilled a prescription for Norco, but explained to her that she   needs to see her family physician on a regular basis due to at times,   significant hypertension.  She has seen a cardiologist in the recent past and he   has increased her lisinopril, but she should receive medical followup given her   multiple comorbidities, but from our standpoint, we should repeat a carotid   ultrasound in two to three months.      YOVANI/SADIE  dd: 02/08/2017 13:25:30 (CST)  td: 02/08/2017 16:16:07 (CST)  Doc ID   #[id number]  Job ID #[id number]    CC:

## 2017-03-14 NOTE — ED AVS SNAPSHOT
[facility] [address] [place] [address]               Lary Sandoval   3/14/2017  7:29 PM   ED    Description:  Female : 1965   Department:  [facility]           Your Care was Coordinated By:     Provider Role From To    Angel Luis Maddox MD Attending Provider 17 2130 --      Reason for Visit     Abdominal Pain           Diagnoses this Visit        Comments    Trichomonal infection    -  Primary     Lower abdominal pain           ED Disposition     None           To Do List           Follow-up Information     Schedule an appointment as soon as possible for a visit with Rick Barcenas MD.    Specialty:  Cardiology    Why:  As needed    Contact information:    [address]  2ND FLOOR  [facility] [id number] [phone]          Follow up with [facility].    Specialty:  Emergency Medicine    Why:  If symptoms worsen    Contact information:    [address] [place] [id number] [phone]       These Medications        Disp Refills Start End    polyethylene glycol (GLYCOLAX) 17 gram PwPk 10 each 0 3/15/2017     Take 17 g by mouth once daily. - Oral    Pharmacy: [facility] [address] [place] [address] [address] [facility] Ph #: [phone]       metronidazole (FLAGYL) 500 MG tablet 21 tablet 0 3/15/2017 3/22/2017    Take 1 tablet (500 mg total) by mouth 3 (three) times daily. - Oral    Pharmacy: [facility] [address] [facility] [address] [facility] AT [facility] Ph #: [phone]         [facility]     [facility] Care [facility] -  Assistance  Registered nurses in the [facility] provide clinical advisement, health education, appointment booking, and other advisory services.  Call for this free service at [phone].             Medications           Message regarding Medications     Verify the changes and/or  additions to your medication regime listed below are the same as discussed with your clinician today.  If any of these changes or additions are incorrect, please notify your healthcare provider.        START taking these NEW medications        Refills    polyethylene glycol (GLYCOLAX) 17 gram PwPk 0    Sig: Take 17 g by mouth once daily.    Class: Print    Route: Oral    metronidazole (FLAGYL) 500 MG tablet 0    Sig: Take 1 tablet (500 mg total) by mouth 3 (three) times daily.    Class: Print    Route: Oral      These medications were administered today        Dose Freq    ondansetron injection 4 mg 4 mg ED 1 Time    Sig: Inject 4 mg into the vein ED 1 Time.    Class: Normal    Route: Intravenous    morphine injection 4 mg 4 mg ED 1 Time    Sig: Inject 1 mL (4 mg total) into the vein ED 1 Time.    Class: Normal    Route: Intravenous    dicyclomine capsule 20 mg 20 mg ED 1 Time    Sig: Take 2 capsules (20 mg total) by mouth ED 1 Time.    Class: Normal    Route: Oral    diphenhydrAMINE injection 12.5 mg 12.5 mg ED 1 Time    Sig: Inject 0.25 mLs (12.5 mg total) into the vein ED 1 Time.    Class: Normal    Route: Intravenous    omnipaque 350 iohexol 350 mg iodine/mL      Notes to Pharmacy: Created by cabinet override      STOP taking these medications     hydrocodone-acetaminophen 5-325mg (NORCO) 5-325 mg per tablet Take 1 tablet by mouth every 6 (six) hours as needed for Pain.    wheat dextrin 3 gram/3.8 gram Powd Take 6 g by mouth 2 (two) times daily.    citalopram (CELEXA) 10 MG tablet TK 1 T PO QD    zolpidem (AMBIEN) 5 MG Tab Take 5 mg by mouth every evening.    spironolactone (ALDACTONE) 25 MG tablet Take 25 mg by mouth once daily.           Verify that the below list of medications is an accurate representation of the medications you are currently taking.  If none reported, the list may be blank. If incorrect, please contact your healthcare provider. Carry this list with you in case of emergency.          "  Current Medications     alprazolam (XANAX) 0.5 MG tablet Take 0.5 mg by mouth 2 (two) times daily as needed for Anxiety.    amiodarone (PACERONE) 200 MG Tab Take 200 mg by mouth 2 (two) times daily.     amlodipine (NORVASC) 5 MG tablet Take 5 mg by mouth once daily.    aspirin 81 MG Chew Take 1 tablet (81 mg total) by mouth once daily.    atorvastatin (LIPITOR) 20 MG tablet Take 1 tablet (20 mg total) by mouth once daily.    ELIQUIS 5 mg Tab TK 1 T PO BID FOR 4 WEEKS    folic acid (FOLVITE) 1 MG tablet Take 1 mg by mouth once daily.    hydrochlorothiazide (HYDRODIURIL) 25 MG tablet TK 1 T PO QD    isosorbide mononitrate (IMDUR) 30 MG 24 hr tablet Take 30 mg by mouth once daily.    lisinopril (PRINIVIL,ZESTRIL) 40 MG tablet Take 40 mg by mouth once daily.    methimazole (TAPAZOLE) 10 MG Tab Take 10 mg by mouth 2 (two) times daily.    metoprolol succinate (TOPROL-XL) 50 MG 24 hr tablet Take 50 mg by mouth once daily.    ondansetron (ZOFRAN-ODT) 4 MG TbDL Take 2 tablets (8 mg total) by mouth every 8 (eight) hours as needed.    pantoprazole (PROTONIX) 40 MG tablet TK 1 T PO QD    metronidazole (FLAGYL) 500 MG tablet Take 1 tablet (500 mg total) by mouth 3 (three) times daily.    polyethylene glycol (GLYCOLAX) 17 gram PwPk Take 17 g by mouth once daily.           Clinical Reference Information           Your Vitals Were     BP Pulse Temp Resp Height Weight    195/80 88 98.4 °F (36.9 °C) (Oral) 16 5' 2" (1.575 m) 55.3 kg (122 lb)    Last Period SpO2 BMI          06/06/2014 (Approximate) 99% 22.31 kg/m2        Allergies as of 3/15/2017        Reactions    Clonidine Itching, Swelling    Tramadol Itching, Swelling    Penicillins       Immunizations Administered on Date of Encounter - 3/15/2017     None      ED Micro, Lab, POCT     Start Ordered       Status Ordering Provider    03/15/17 0030 03/15/17 0029  Vaginal Screen Vagina  STAT      In process     03/14/17 1951 03/14/17 1950  Acetaminophen level  Once      Final " result     03/14/17 1932 03/14/17 1931  Lactic acid, plasma  STAT      Final result     03/14/17 1931 03/14/17 1931  CBC W/ AUTO DIFFERENTIAL  Once      Final result     03/14/17 1931 03/14/17 1931  Comp. Metabolic Panel  STAT      Final result     03/14/17 1931 03/14/17 1931  Lipase  STAT      Final result     03/14/17 1931 03/14/17 1931  Urinalysis - Clean Catch  STAT      Final result     03/14/17 1931 03/14/17 1931  Urinalysis Microscopic  Once      Final result       ED Imaging Orders     Start Ordered       Status Ordering Provider    03/14/17 2120 03/14/17 2120  CT Abdomen Pelvis With Contrast  1 time imaging      In process     03/14/17 1932 03/14/17 1931  X-Ray Abdomen Flat And Erect  1 time imaging      In process       Discharge References/Attachments     CONSTIPATION (ADULT) (ENGLISH)    TRICHOMONAS VAGINALIS (DISCHARGE) (ENGLISH)       [facility] complies with applicable Federal civil rights laws and does not discriminate on the basis of race, color, national origin, age, disability, or sex.        Language Assistance Services     ATTENTION: Language assistance services are available, free of charge. Please call [phone].      ATENCIÓN: Si habla español, tiene a mata disposición servicios gratuitos de asistencia lingüística. Llame al [phone].     CHÚ Ý: N?u b?n nói Ti?ng Vi?t, có các d?ch v? h? tr? ngôn ng? mi?n phí dành cho b?n. G?i s? [phone].

## 2017-03-15 NOTE — ED NOTES
"Presents to the ER with c/o epigastric and suprapubic pain that has been intermittent for the past week. Patient reports that pain is "stabbing." associated complaints are nausea. Mucous membranes are pink and moist. Skin is warm, dry and intact. Lungs are clear bilaterally, respirations are regular and unlabored. Denies cough, congestion, rhinorrhea or SOB. BS active x4, tenderness to epigastric and suprapubic area with palpation, abd is soft and not distended.perfecto has left sided upper extremity weakness that is baseline since having her stroke.AAOx4  Denies any appetite or activity change. S1S2, capillary refill is < 2 seconds. Denies dysuria, difficulty urinating, frequency, numbness, tingling or weakness. JORDIN GREGGS    "

## 2017-03-15 NOTE — ED NOTES
Upon discharge, patient is AAOx4, no cardiac or respiratory complications. Follow up care and  Medications have been reviewed with patient and has been instructed to return to the ER if needed. Patient verbalized understanding and ambulated to the lobby without difficulty. JERONIMO BRENNER.

## 2017-12-11 PROBLEM — G93.6 CYTOTOXIC CEREBRAL EDEMA: Status: ACTIVE | Noted: 2017-01-01

## 2017-12-11 PROBLEM — I63.512 ACUTE ISCHEMIC LEFT MCA STROKE: Status: ACTIVE | Noted: 2017-01-01

## 2017-12-11 PROBLEM — I63.9 CEREBROVASCULAR ACCIDENT (CVA): Status: ACTIVE | Noted: 2017-01-01

## 2017-12-11 PROBLEM — I63.412 EMBOLIC STROKE INVOLVING LEFT MIDDLE CEREBRAL ARTERY: Status: ACTIVE | Noted: 2017-01-01

## 2017-12-11 PROBLEM — Z86.73 HISTORY OF RIGHT MCA STROKE: Status: ACTIVE | Noted: 2017-01-01

## 2017-12-11 PROBLEM — I25.10 CAD (CORONARY ARTERY DISEASE): Status: ACTIVE | Noted: 2017-01-01

## 2017-12-11 PROBLEM — I50.9 CHF (CONGESTIVE HEART FAILURE): Status: ACTIVE | Noted: 2017-01-01

## 2017-12-11 NOTE — ASSESSMENT & PLAN NOTE
- H/o CHF, Unclear baseline    - F/u trop / bnp / cxr   - continuous tele / strict I/O  - Holding BB/thiazide/Imdur/amio/amlo at the setting of acute stroke pending IR

## 2017-12-11 NOTE — PROGRESS NOTES
Procedure complete, pt tolerated well.  Hemostasis achieved by angioseal at this time, dry sterile drsg applied. Pt to remain flat until 1955. ICU RN at bedside, updates given.

## 2017-12-11 NOTE — ASSESSMENT & PLAN NOTE
Permissive HTN < 220/120 until results of thrombectomy. If full reperfusion, would recommend < 160/100 as target  Resume home antihypertensives 48-72 hours from onset.

## 2017-12-11 NOTE — ED PROVIDER NOTES
Encounter Date: 12/11/2017    SCRIBE #1 NOTE: I, Ragini Borges, am scribing for, and in the presence of,  Dr. Comer. I have scribed the following portions of the note - the EKG reading.       History     Chief Complaint   Patient presents with    Cerebrovascular Accident     HPI   51 y/o woman w/ h/o Afib on anticoagulation, CHF on digoxin, CAD, HTN, remote R MCA stroke transferred from [facility] for acute L MCA stroke. History per report from transferring facility. Patient presented with weakness and fall with abdominal pain. Admitted for suspicion for GIB, however while in ED had a seizure, required intubation. CT showed acute L MCA occlusion. Transferred for neurointerventional procedure.      Review of patient's allergies indicates:   Allergen Reactions    Clonidine Itching and Swelling    Tramadol Itching and Swelling    Penicillins      Past Medical History:   Diagnosis Date    Anticoagulant long-term use     Atrial fibrillation     CHF (congestive heart failure)     COPD (chronic obstructive pulmonary disease) 4/7/2016    Coronary artery disease     Encounter for blood transfusion 4/6/2016    received 3 units blood yest    Hypertension     Hyperthyroidism 12/13/2017    MI (myocardial infarction)     Stroke      Past Surgical History:   Procedure Laterality Date    APPENDECTOMY      CARDIAC CATHETERIZATION      x2 no stents    COLONOSCOPY N/A 4/8/2016    Procedure: COLONOSCOPY;  Surgeon: Roque Bobby MD;  Location: [facility];  Service: Endoscopy;  Laterality: N/A;    knee repl;acement      pt states did not have knee repalcement,acl repair only    TUBAL LIGATION       Family History   Problem Relation Age of Onset    Hypertension Mother     Stroke Father     Hypertension Father      Social History   Substance Use Topics    Smoking status: Former Smoker     Packs/day: 0.25     Types: Cigarettes     Quit date: 1/26/2017    Smokeless tobacco: Never Used     Alcohol use Not on file     Review of Systems   Unable to perform ROS: Patient nonverbal       Physical Exam     Initial Vitals [12/11/17 1455]   BP Pulse Resp Temp SpO2   (!) 191/99 (!) 147 15 97.6 °F (36.4 °C) 100 %      MAP       129.67         Physical Exam    Nursing note and vitals reviewed.  Constitutional:   Intubated, unresponsive   HENT:   Head: Normocephalic and atraumatic.   Eyes: EOM are normal. Pupils are equal, round, and reactive to light.   Pupils 3mm equal and reactive though sluggish   Neck: No tracheal deviation present. No JVD present.   Cardiovascular:   No murmur heard.  Tachycardic, irregular   Pulmonary/Chest: No respiratory distress. She has no wheezes. She has no rhonchi. She has no rales.   Abdominal: Soft. Bowel sounds are normal. She exhibits no distension. There is no tenderness. There is no rebound and no guarding.   Musculoskeletal: She exhibits no edema.   No deformity, edema, asymmetry   Lymphadenopathy:     She has no cervical adenopathy.   Neurological:   GCS3T, no reponse to verbal/tactile/noxious stimuli. No sedation. Pupillary and corneal reflex intact. No gaze preference. 1+ patellar and biciptal reflexes, symmetric. Remainder of exam limited by mental status.   Skin: Skin is warm and dry.         ED Course   Procedures  Labs Reviewed   CBC W/ AUTO DIFFERENTIAL - Abnormal; Notable for the following:        Result Value    WBC 12.97 (*)     MCV 79 (*)     MCH 24.5 (*)     MCHC 31.1 (*)     RDW 20.4 (*)     Platelets 525 (*)     Gran # 12.3 (*)     Immature Grans (Abs) 0.06 (*)     Lymph # 0.5 (*)     Mono # 0.1 (*)     Gran% 95.0 (*)     Lymph% 3.5 (*)     Mono% 0.8 (*)     All other components within normal limits   COMPREHENSIVE METABOLIC PANEL - Abnormal; Notable for the following:     Sodium 135 (*)     Potassium 3.4 (*)     CO2 18 (*)     Glucose 255 (*)     Total Protein 8.5 (*)     Alkaline Phosphatase 190 (*)     All other components within normal limits   TSH -  Abnormal; Notable for the following:     TSH 0.124 (*)     All other components within normal limits   LIPID PANEL - Abnormal; Notable for the following:     Cholesterol 205 (*)     All other components within normal limits   TROPONIN I - Abnormal; Notable for the following:     Troponin I 0.083 (*)     All other components within normal limits   B-TYPE NATRIURETIC PEPTIDE - Abnormal; Notable for the following:     BNP 1,163 (*)     All other components within normal limits   DIGOXIN LEVEL - Abnormal; Notable for the following:     Digoxin Lvl 0.3 (*)     All other components within normal limits   T4, FREE - Abnormal; Notable for the following:     Free T4 1.76 (*)     All other components within normal limits   ISTAT PROCEDURE - Abnormal; Notable for the following:     POC Glucose 262 (*)     POC BUN 5 (*)     POC Potassium 3.3 (*)     POC TCO2 (MEASURED) 19 (*)     All other components within normal limits   ISTAT PROCEDURE - Abnormal; Notable for the following:     POC PCO2 29.3 (*)     POC PO2 143 (*)     POC HCO3 20.2 (*)     POC TCO2 21 (*)     All other components within normal limits     EKG Readings: (Independently Interpreted)   Initial Reading: No STEMI. Heart Rate: 118.   Irregularly Irregular                APC / Resident Notes:   DDx incl ICH, MCA, seizure, post-ictal state, NCSE, AFIb w/ RVR, HTN emergency, among others. IR notified and expecting patient. Vascular neurology and neurocritical care notified and at bedside. CTH shows only remote R MCA stroke. Will get repeat labs, incl digoxin level. Bedside US shows no effusion, decreased EF (~25-30% on my estimation). Will restart diltiazem for rate control, neuro recommends against blood pressure reduction at this point. A-line placed by neuro ICU. Awaiting transport to IR.   Ajit Dyer M.D.  PGY4 [employer] EM/IM  12/11/2017 3:55 PM         Scribe Attestation:   Scribe #1: I performed the above scribed service and the documentation accurately describes  the services I performed. I attest to the accuracy of the note.    Attending Attestation:   Physician Attestation Statement for Resident:  As the supervising MD  I agree with the above history. -:   As the supervising MD I agree with the above PE.    As the supervising MD I agree with the above treatment, course, plan, and disposition.  I have reviewed and agree with the residents interpretation of the following: lab data and EKG.  I have reviewed the following: records from a referring facility.                    ED Course      Clinical Impression:   The primary encounter diagnosis was Cerebrovascular accident (CVA), unspecified mechanism. Diagnoses of Seizure, Acute encephalopathy, Embolic stroke involving left middle cerebral artery, Symptomatic anemia, Gastrointestinal hemorrhage, unspecified gastrointestinal hemorrhage type, Acute blood loss anemia, Acute respiratory failure with hypoxia, and HERRERA (acute kidney injury) were also pertinent to this visit.    Disposition:   Disposition: Admitted  Condition: Serious                        Ajit Dyer MD  Resident  12/11/17 2011       Edison Comer MD  12/29/17 1019

## 2017-12-11 NOTE — ASSESSMENT & PLAN NOTE
Acute left MCA M1 occlusion w/ infarct developed in deep territory w/ sparing of most of peripheral MCA territories. CT scan on arrival shows similar pattern on MRi w/o substantial evolution. No tpa d/t recent GI bleed. Plan is for IR for thrombectomy. Delay to IR d/t medical stabilization as patient was found to be intubated on arrival (no report of this prior to reaching us) w/ A.fib -170's on diltiazem gtt.   Antithrombotics for secondary stroke prevention: Antiplatelets:  Aspirin: 81 mg oral now and daily and w/ close monitoring of GIB w/ H/H  Statins for secondary stroke prevention and hyperlipidemia, if present: Atorvastatin- 40 mg oral daily  Aggressive risk factor modification: Hypertension  Atrial Fibrillation  Rehab Efforts: Physical Therapy  Occupational Therapy  Speech and Language Pathology  Diagnostics: Ordered/Pending HgbA1C to assess blood glucose levels  Lipid Profile to assess cholesterol levels  MRI head without contrast to assess brain parenchyma  Trans-thoracic cardiac echo to assess cardiac function/status  VTE Prophylaxis: Heparin 5000 units SQ every 8 hours   Permissive HTN < 220/120 depending upon reperfusion status of left M1 occlusion post thrombectomy

## 2017-12-11 NOTE — ED TRIAGE NOTES
Transfer from [facility] for neuro, possible IR pt. Seen at that facility for GI bleed, and while boarding in ED had a seizure, given ativan around 0745. Intubated at that time. CTA showed new M1 clot and changed on MRI. Hx of stroke in past. Baseline AAOx3 and ambulatory. Arrives on Cardizem infusion at 15 mg/hr via EMS. (No report was called, pt arrived intubated and [facility] ED staff unaware, very poor report given to charge nurse once transferring facility was called)

## 2017-12-11 NOTE — ED NOTES
LOC: Unresponsive, not on sedation medication  SKIN: The skin is warm, dry  RESPIRATORY: Airway is open and patent. Bilateral chest rise and fall. Respirations are spontaneous, even and unlabored. Normal effort and rate noted. No accessory muscle use noted.   CARDIAC: Irregular, tachycardiac rhythm.   ABDOMEN: Soft and non tender to palpation. No distention noted.   URINARY: Arias catheter in place, draining yellow colored urine.   NEUROLOGIC: See neuro flow sheet.   MUSCULOSKELETAL: No obvious deformities noted.

## 2017-12-11 NOTE — H&P
[facility]  Neurocritical Care  History & Physical    Admit Date: 12/11/2017  Service Date: 12/11/2017  Length of Stay: 0    Subjective:     Chief Complaint: <principal problem not specified>    History of Present Illness: Lary Cardozo is a 53 y/o woman with medical history of R MCA stroke, with residual LSW, Afib on anticoagulation (eliquis), CAD / HTN / CHF on amiodarone, CCB, HCTZ, BB, nitrate presenting as transfer from    from [facility] for acute L MCA stroke.     History per records. Patient presented with weakness and fall with abdominal pain. Admitted for suspicion for GIB, however while in ED had a seizure, required intubation. CT showed acute L MCA occlusion.     At [facility], CTA revealed LVO / M1 and admitted for neurointerventional procedure / NICU close monitoring.     Past Medical History:   Diagnosis Date    Anticoagulant long-term use     Atrial fibrillation     CHF (congestive heart failure)     COPD (chronic obstructive pulmonary disease) 4/7/2016    Coronary artery disease     Encounter for blood transfusion 4/6/2016    received 3 units blood yest    Hypertension     MI (myocardial infarction)     Stroke      Past Surgical History:   Procedure Laterality Date    APPENDECTOMY      CARDIAC CATHETERIZATION      x2 no stents    COLONOSCOPY N/A 4/8/2016    Procedure: COLONOSCOPY;  Surgeon: Roque Bobby MD;  Location: [facility];  Service: Endoscopy;  Laterality: N/A;    knee repl;acement      pt states did not have knee repalcement,acl repair only    TUBAL LIGATION        No current facility-administered medications on file prior to encounter.      Current Outpatient Prescriptions on File Prior to Encounter   Medication Sig Dispense Refill    alprazolam (XANAX) 0.5 MG tablet Take 0.5 mg by mouth 2 (two) times daily as needed for Anxiety.      amiodarone (PACERONE) 200 MG Tab Take 200 mg by mouth 2 (two) times daily.       amlodipine  (NORVASC) 5 MG tablet Take 5 mg by mouth once daily.      aspirin 81 MG Chew Take 1 tablet (81 mg total) by mouth once daily. 30 tablet 0    ELIQUIS 5 mg Tab TK 1 T PO BID FOR 4 WEEKS  0    folic acid (FOLVITE) 1 MG tablet Take 1 mg by mouth once daily.      hydrochlorothiazide (HYDRODIURIL) 25 MG tablet TK 1 T PO QD  1    isosorbide mononitrate (IMDUR) 30 MG 24 hr tablet Take 30 mg by mouth once daily.      lisinopril (PRINIVIL,ZESTRIL) 40 MG tablet Take 40 mg by mouth once daily.      methimazole (TAPAZOLE) 10 MG Tab Take 10 mg by mouth 2 (two) times daily.      metoprolol succinate (TOPROL-XL) 50 MG 24 hr tablet Take 50 mg by mouth once daily.      ondansetron (ZOFRAN-ODT) 4 MG TbDL Take 2 tablets (8 mg total) by mouth every 8 (eight) hours as needed. 12 tablet 0    pantoprazole (PROTONIX) 40 MG tablet TK 1 T PO QD  0    polyethylene glycol (GLYCOLAX) 17 gram PwPk Take 17 g by mouth once daily. 10 each 0      Allergies: Clonidine; Tramadol; and Penicillins    Family History   Problem Relation Age of Onset    Hypertension Mother     Stroke Father     Hypertension Father      Social History   Substance Use Topics    Smoking status: Former Smoker     Packs/day: 0.25     Types: Cigarettes     Quit date: 1/26/2017    Smokeless tobacco: Never Used    Alcohol use Not on file     Review of Systems   Unable to perform ROS: Intubated   Constitutional: Negative for fever.   Neurological: Positive for seizures and weakness. Negative for facial asymmetry.     Objective:     Vitals:  Temp: 97.6 °F (36.4 °C) (12/11/17 1455)  Pulse: (!) 143 (12/11/17 1615)  Resp: (!) 26 (12/11/17 1615)  BP: (!) 185/89 (12/11/17 1615)  SpO2: 100 % (12/11/17 1615)    Temp:  [97.6 °F (36.4 °C)] 97.6 °F (36.4 °C)  Pulse:  [118-147] 143  Resp:  [15-41] 26  SpO2:  [100 %] 100 %  BP: (184-203)/(85-99) 185/89  Arterial Line BP: (179-189)/(77-94) 179/77         Oxygen Concentration (%):  [40] 40  Resp Rate Total:  [15 br/min] 15  br/min    No intake/output data recorded.    Physical Exam   HENT:   Head: Normocephalic and atraumatic.   Intubated / off sedation    Eyes: EOM are normal. Pupils are equal, round, and reactive to light.   Neck: Neck supple.   Cardiovascular:   Afib with RVR    Pulmonary/Chest:   Vent support AC mode    Abdominal: Soft.   Neurological: She displays no atrophy. No cranial nerve deficit. She exhibits normal muscle tone.   Reflex Scores:       Tricep reflexes are 3+ on the right side and 3+ on the left side.       Bicep reflexes are 3+ on the right side and 3+ on the left side.       Brachioradialis reflexes are 3+ on the right side and 3+ on the left side.       Patellar reflexes are 3+ on the right side and 3+ on the left side.  Comatose / Intubated / off sedation   - Pupils 3-4 mm equal and reactive  - Oculocephalic's + / EOM full  - No facial asymmetry / + gag  - Motor/ Sensory: Extensor posturing on RUE/RLE to pain / LUE / LLE +? Withdrawal   - Brisk reflexes      Unable to test orientation, language, memory, judgment, insight, fund of knowledge, hearing, shoulder shrug, tongue protrusion, coordination, gait due to level of consciousness.    Today I personally reviewed pertinent medications, lines/drains/airways, imaging, cardiology, lab results, microbiology results, notably:        Assessment/Plan:     Neuro   History of right MCA stroke    - likely embolic / on chronic anticoagulation for Afib  - active concerns for medication non-compliance for recurrence of stroke         Acute ischemic left MCA stroke    - 51 y/o woman with history of R MCA stroke, with residual LSW, Afib on anticoagulation (eliquis), CAD / HTN / CHF on amiodarone, CCB, HCTZ, BB, nitrate presenting as transfer from  from [facility] for acute L MCA stroke.      - Was admitted for suspicion for GIB, however in ED had a seizure, required intubation.  - CT showed left BG hypodensity   - CTA revealed LVO / M1   - Stroke etiology,  despite anti-coagulation raises concerns for non-compliance     Plan:   - IR intervention for thrombectomy   - Permissive HTN prior to IR procedure / Shall set BP targets post IR / TICI flow eval   ---- On diltiazem drip to control AFib with RVR + maintenance of permissive HTN  ---- Shall consider labetalol / amio orders as per BP/HR targets post IR  - secondary stroke prophylaxis / consideration for post IR  - Vascular neurology on board, appreciate héctor  - keppra maintanence  - close neuro monitoring / F/u imaging's, lipid panel, A1C, TSH        Pulmonary   COPD (chronic obstructive pulmonary disease)    - intubated  - F/u CXR         Cardiac/Vascular   CHF (congestive heart failure)    - H/o CHF, Unclear baseline    - F/u trop / bnp / cxr   - continuous tele / strict I/O  - Holding BB/thiazide/Imdur/amio/amlo at the setting of acute stroke pending IR          S/P carotid endarterectomy    - IR angiogram evaluation         Hypertension    - permissive HTN prior to IR procedure  - HTN target as per TICI flow evaluation         Atrial fibrillation with RVR    - Admission concerns of Afib with RVR    - On diltiazem drip for rate control and permissive HTN  - Shall consider BB / amiodarone post IR thrombectomy procedure  - Trend trops / F/u CXR, BNP  - Shall consider cardiology consults for further assistance         Renal/   Hypokalemia    - correction with IV meds  - target K > 4             Prophylaxis:  Venous Thromboembolism: mechanical  Stress Ulcer: H2B  Ventilator Pneumonia: no     Activity Orders          None        Full Code    Silvia Jiménez MD  Neurocritical Care  [facility]

## 2017-12-11 NOTE — ASSESSMENT & PLAN NOTE
- 53 y/o woman with history of R MCA stroke, with residual LSW, Afib on anticoagulation (eliquis), CAD / HTN / CHF on amiodarone, CCB, HCTZ, BB, nitrate presenting as transfer from  from [facility] for acute L MCA stroke.      - Was admitted for suspicion for GIB, however in ED had a seizure, required intubation.  - CT showed left BG hypodensity   - CTA revealed LVO / M1   - Stroke etiology, despite anti-coagulation raises concerns for non-compliance     Plan:   - IR intervention for thrombectomy   - Permissive HTN prior to IR procedure / Shall set BP targets post IR / TICI flow eval   ---- On diltiazem drip to control AFib with RVR + maintenance of permissive HTN  ---- Shall consider labetalol / amio orders as per BP/HR targets post IR  - secondary stroke prophylaxis / consideration for post IR  - Vascular neurology on board, appreciate recs  - keppra maintanence  - close neuro monitoring / F/u imaging's, lipid panel, A1C, TSH

## 2017-12-11 NOTE — HPI
52F admitted to Counts include 234 beds at the [facility] for w/u of general malaise and fall and c/o abdominal pain, + occult blood and planned for observation in ICU overnight for GIB f/o. While boarding in ED she was found to have a generalized seizure at ~0745 witnessed by lab draw crew, s/p Ativan which helped to break the seizure and then was subsequently intubated. After intubation she was noted to have R sided paralysis and was moving her left side spontaneously. A teleneurology consult as performed (not [facility]) who recommended full workup and Keppra was given.     MRI+ for deep left basal ganglia and corona radiata ischemia (ASPECTS 7) and CTA showed left M1 occlusion. Patient has history of large R MCA stroke in 2014 with apparently not much residual symptoms. She also has reported L CEA 2017 (unknown where).

## 2017-12-11 NOTE — PROCEDURES
Radiology Post-Procedure Note    Pre Op Diagnosis: left MCA stroke    Post Op Diagnosis: same, S/P left MCA thrombectomy    Procedure: Cerebral angiogram    Procedure performed by: Raciel Sahu MD,   Chu Del Rio MD    Written Informed Consent Obtained: No: emergency consent declared    Specimen Removed: YES thrombus    Estimated Blood Loss: less than 100     Procedure report:     An 8F sheath was placed into the right femoral artery and a 5F Jaime catheter was advanced into the aortic arch.  The left internal carotid arteriy was subselected and angiography of the brain was performed showing left M1 occlusion.  This was removed using a single pass of the Kristie catheter 070.  TICI 2B reperfusion noted at 1728.  Groin puncture was 1714.  No comps.    A right femoral artery angiogram was performed, the sheath removed and hemostasis achieved using Angioseal.  No hematoma was present at the time of hemostasis.    The patient tolerated the procedure well.     Raciel Sahu MD  Attending Radiologist  Interventional Neuroradiology

## 2017-12-11 NOTE — ASSESSMENT & PLAN NOTE
- Admission concerns of Afib with RVR    - On diltiazem drip for rate control and permissive HTN  - Shall consider BB / amiodarone post IR thrombectomy procedure  - Trend trops / F/u CXR, BNP  - Shall consider cardiology consults for further assistance

## 2017-12-11 NOTE — ASSESSMENT & PLAN NOTE
Currently on diltiazem gtt, would continue with titration and adjust rate control depending upon reperfusion status of left M1. If full reperfusion, would recommend a lower blood pressure target and thus more ability to aggressively target heart rate. Improved heart rate to augment reperfusion is also priority.  Long term anticoagulation on hold currently d/t workup of current GIB initiated at OSH.

## 2017-12-11 NOTE — HOSPITAL COURSE
12/11: CTA L M1 occlusion. IR procedure. Afib with RVR, management with diltiazem.   12/12: S/p M1 thombectomy / TICI 2B / + Reperfusion injury

## 2017-12-11 NOTE — H&P
Inpatient Radiology Pre-procedure Note    History of Present Illness:  Lary Sandoval is a 52 y.o. female with acute left MCA stroke transferred from [facility].  Pt has remote large right MCA stroke.    Admission H&P reviewed.  Past Medical History:   Diagnosis Date    Anticoagulant long-term use     Atrial fibrillation     CHF (congestive heart failure)     COPD (chronic obstructive pulmonary disease) 4/7/2016    Coronary artery disease     Encounter for blood transfusion 4/6/2016    received 3 units blood yest    Hypertension     MI (myocardial infarction)     Stroke      Past Surgical History:   Procedure Laterality Date    APPENDECTOMY      CARDIAC CATHETERIZATION      x2 no stents    COLONOSCOPY N/A 4/8/2016    Procedure: COLONOSCOPY;  Surgeon: Roque Bobby MD;  Location: [facility];  Service: Endoscopy;  Laterality: N/A;    knee repl;acement      pt states did not have knee repalcement,acl repair only    TUBAL LIGATION         Review of Systems:   As documented in primary team H&P    Home Meds:   Prior to Admission medications    Medication Sig Start Date End Date Taking? Authorizing Provider   alprazolam (XANAX) 0.5 MG tablet Take 0.5 mg by mouth 2 (two) times daily as needed for Anxiety.    Historical Provider, MD   amiodarone (PACERONE) 200 MG Tab Take 200 mg by mouth 2 (two) times daily.     Historical Provider, MD   amlodipine (NORVASC) 5 MG tablet Take 5 mg by mouth once daily.    Historical Provider, MD   aspirin 81 MG Chew Take 1 tablet (81 mg total) by mouth once daily. 4/8/16   Fabio Coyne MD   ELIQUIS 5 mg Tab TK 1 T PO BID FOR 4 WEEKS 12/22/16   Historical Provider, MD   folic acid (FOLVITE) 1 MG tablet Take 1 mg by mouth once daily.    Historical Provider, MD   hydrochlorothiazide (HYDRODIURIL) 25 MG tablet TK 1 T PO QD 1/15/17   Historical Provider, MD   isosorbide mononitrate (IMDUR) 30 MG 24 hr tablet Take 30 mg by mouth once daily.    Historical Provider, MD    lisinopril (PRINIVIL,ZESTRIL) 40 MG tablet Take 40 mg by mouth once daily.    Historical Provider, MD   methimazole (TAPAZOLE) 10 MG Tab Take 10 mg by mouth 2 (two) times daily.    Historical Provider, MD   metoprolol succinate (TOPROL-XL) 50 MG 24 hr tablet Take 50 mg by mouth once daily.    Historical Provider, MD   ondansetron (ZOFRAN-ODT) 4 MG TbDL Take 2 tablets (8 mg total) by mouth every 8 (eight) hours as needed. 3/9/15   Guido Perez MD   pantoprazole (PROTONIX) 40 MG tablet TK 1 T PO QD 12/18/16   Historical Provider, MD   polyethylene glycol (GLYCOLAX) 17 gram PwPk Take 17 g by mouth once daily. 3/15/17   Angel Luis Maddox MD     Scheduled Meds:   Continuous Infusions:   PRN Meds:  Anticoagulants/Antiplatelets: eloquis, Aspirin    Allergies:   Review of patient's allergies indicates:   Allergen Reactions    Clonidine Itching and Swelling    Tramadol Itching and Swelling    Penicillins      Sedation Hx: have not been any systemic reactions    Labs:  No results for input(s): INR in the last 168 hours.    Invalid input(s):  PT,  PTT  No results for input(s): WBC, HGB, HCT, MCV, PLT in the last 168 hours. No results for input(s): GLU, NA, K, CL, CO2, BUN, CREATININE, CALCIUM, MG, ALT, AST, ALBUMIN, BILITOT, BILIDIR in the last 168 hours.      Vitals:        Physical Exam:  ASA: 3  Mallampati: 2    Assessment / Plan: 52 year old female with acute left MCA stroke.    Cerebral angiogram / possible thrombectomy.    No family member immediately available for consent, procedure to be performed on emergent basis.    Sedation Plan: light to moderate, PRN    Chu Del Rio MD  Neurointerventional Fellow  Department of Radiology  [phone]

## 2017-12-11 NOTE — SUBJECTIVE & OBJECTIVE
Past Medical History:   Diagnosis Date    Anticoagulant long-term use     Atrial fibrillation     CHF (congestive heart failure)     COPD (chronic obstructive pulmonary disease) 4/7/2016    Coronary artery disease     Encounter for blood transfusion 4/6/2016    received 3 units blood yest    Hypertension     MI (myocardial infarction)     Stroke      Past Surgical History:   Procedure Laterality Date    APPENDECTOMY      CARDIAC CATHETERIZATION      x2 no stents    COLONOSCOPY N/A 4/8/2016    Procedure: COLONOSCOPY;  Surgeon: Roque Bobby MD;  Location: [facility];  Service: Endoscopy;  Laterality: N/A;    knee repl;acement      pt states did not have knee repalcement,acl repair only    TUBAL LIGATION        No current facility-administered medications on file prior to encounter.      Current Outpatient Prescriptions on File Prior to Encounter   Medication Sig Dispense Refill    alprazolam (XANAX) 0.5 MG tablet Take 0.5 mg by mouth 2 (two) times daily as needed for Anxiety.      amiodarone (PACERONE) 200 MG Tab Take 200 mg by mouth 2 (two) times daily.       amlodipine (NORVASC) 5 MG tablet Take 5 mg by mouth once daily.      aspirin 81 MG Chew Take 1 tablet (81 mg total) by mouth once daily. 30 tablet 0    ELIQUIS 5 mg Tab TK 1 T PO BID FOR 4 WEEKS  0    folic acid (FOLVITE) 1 MG tablet Take 1 mg by mouth once daily.      hydrochlorothiazide (HYDRODIURIL) 25 MG tablet TK 1 T PO QD  1    isosorbide mononitrate (IMDUR) 30 MG 24 hr tablet Take 30 mg by mouth once daily.      lisinopril (PRINIVIL,ZESTRIL) 40 MG tablet Take 40 mg by mouth once daily.      methimazole (TAPAZOLE) 10 MG Tab Take 10 mg by mouth 2 (two) times daily.      metoprolol succinate (TOPROL-XL) 50 MG 24 hr tablet Take 50 mg by mouth once daily.      ondansetron (ZOFRAN-ODT) 4 MG TbDL Take 2 tablets (8 mg total) by mouth every 8 (eight) hours as needed. 12 tablet 0    pantoprazole (PROTONIX) 40 MG tablet TK 1 T PO QD   0    polyethylene glycol (GLYCOLAX) 17 gram PwPk Take 17 g by mouth once daily. 10 each 0      Allergies: Clonidine; Tramadol; and Penicillins    Family History   Problem Relation Age of Onset    Hypertension Mother     Stroke Father     Hypertension Father      Social History   Substance Use Topics    Smoking status: Former Smoker     Packs/day: 0.25     Types: Cigarettes     Quit date: 1/26/2017    Smokeless tobacco: Never Used    Alcohol use Not on file     Review of Systems   Unable to perform ROS: Intubated   Constitutional: Negative for fever.   Neurological: Positive for seizures and weakness. Negative for facial asymmetry.     Objective:     Vitals:  Temp: 97.6 °F (36.4 °C) (12/11/17 1455)  Pulse: (!) 143 (12/11/17 1615)  Resp: (!) 26 (12/11/17 1615)  BP: (!) 185/89 (12/11/17 1615)  SpO2: 100 % (12/11/17 1615)    Temp:  [97.6 °F (36.4 °C)] 97.6 °F (36.4 °C)  Pulse:  [118-147] 143  Resp:  [15-41] 26  SpO2:  [100 %] 100 %  BP: (184-203)/(85-99) 185/89  Arterial Line BP: (179-189)/(77-94) 179/77         Oxygen Concentration (%):  [40] 40  Resp Rate Total:  [15 br/min] 15 br/min    No intake/output data recorded.    Physical Exam   HENT:   Head: Normocephalic and atraumatic.   Intubated / off sedation    Eyes: EOM are normal. Pupils are equal, round, and reactive to light.   Neck: Neck supple.   Cardiovascular:   Afib with RVR    Pulmonary/Chest:   Vent support AC mode    Abdominal: Soft.   Neurological: She displays no atrophy. No cranial nerve deficit. She exhibits normal muscle tone.   Reflex Scores:       Tricep reflexes are 3+ on the right side and 3+ on the left side.       Bicep reflexes are 3+ on the right side and 3+ on the left side.       Brachioradialis reflexes are 3+ on the right side and 3+ on the left side.       Patellar reflexes are 3+ on the right side and 3+ on the left side.  Comatose / Intubated / off sedation   - Pupils 3-4 mm equal and reactive  - Oculocephalic's + / EOM full  - No  facial asymmetry / + gag  - Motor/ Sensory: Extensor posturing on RUE/RLE to pain / LUE / LLE +? Withdrawal   - Brisk reflexes      Unable to test orientation, language, memory, judgment, insight, fund of knowledge, hearing, shoulder shrug, tongue protrusion, coordination, gait due to level of consciousness.    Today I personally reviewed pertinent medications, lines/drains/airways, imaging, cardiology, lab results, microbiology results, notably:

## 2017-12-11 NOTE — SUBJECTIVE & OBJECTIVE
Past Medical History:   Diagnosis Date    Anticoagulant long-term use     Atrial fibrillation     CHF (congestive heart failure)     COPD (chronic obstructive pulmonary disease) 4/7/2016    Coronary artery disease     Encounter for blood transfusion 4/6/2016    received 3 units blood yest    Hypertension     MI (myocardial infarction)     Stroke      Past Surgical History:   Procedure Laterality Date    APPENDECTOMY      CARDIAC CATHETERIZATION      x2 no stents    COLONOSCOPY N/A 4/8/2016    Procedure: COLONOSCOPY;  Surgeon: Roque Bobby MD;  Location: [facility];  Service: Endoscopy;  Laterality: N/A;    knee repl;acement      pt states did not have knee repalcement,acl repair only    TUBAL LIGATION       Family History   Problem Relation Age of Onset    Hypertension Mother     Stroke Father     Hypertension Father      Social History   Substance Use Topics    Smoking status: Former Smoker     Packs/day: 0.25     Types: Cigarettes     Quit date: 1/26/2017    Smokeless tobacco: Never Used    Alcohol use Not on file     Review of patient's allergies indicates:   Allergen Reactions    Clonidine Itching and Swelling    Tramadol Itching and Swelling    Penicillins        Medications: I have reviewed the current medication administration record.      (Not in a hospital admission)    Review of Systems   Reason unable to perform ROS: ROS obtained from outside records    Constitutional: Negative for appetite change.   HENT: Negative for congestion.    Eyes: Negative for discharge.   Respiratory: Negative for shortness of breath.    Cardiovascular: Negative for chest pain.   Gastrointestinal: Positive for abdominal pain.        Per outside reports prior to intubation     Endocrine: Negative for cold intolerance.   Genitourinary: Negative for difficulty urinating.   Musculoskeletal: Negative for joint swelling.   Skin: Negative for color change.     Objective:     Vital Signs (Most  Recent):  Temp: 97.6 °F (36.4 °C) (12/11/17 1455)  Pulse: (!) 122 (12/11/17 1730)  Resp: 20 (12/11/17 1730)  BP: (!) 184/76 (12/11/17 1730)  SpO2: 96 % (12/11/17 1730)    Vital Signs Range (Last 24H):  Temp:  [97.6 °F (36.4 °C)]   Pulse:  [112-147]   Resp:  [15-41]   BP: (164-203)/()   SpO2:  [96 %-100 %]   Arterial Line BP: (154-189)/(77-99)     Physical Exam   Constitutional: No distress.   HENT:   Head: Normocephalic.   Right Ear: External ear normal.   Left Ear: External ear normal.   Eyes: Conjunctivae are normal.   Neck: Normal range of motion.   Cardiovascular:   Tachycardia, irregular rhythm   Pulmonary/Chest: Effort normal. No stridor.   Abdominal: There is no tenderness.   Skin: Skin is dry. No rash noted.       Neurological Exam:   PERRL, EOMI with roving horizontal movements  + corneal reflexes  LOC: comatose  Attention Span: comatose  Language: Intubated  Articulation: Untestable due to intubation  Visual Fields: Bilateral visual loss/blindness  no bTT  EOM (CN III, IV, VI): Full/intact  Pupils (CN II, III): PERRL  Motor: R hemiplegia; left UE/LE respond to central painful stimulus w/ antigravity in LUE  Sensation: responds to central painful stimulus with reaching up with left UE       Laboratory:  CMP:   Recent Labs  Lab 12/11/17  1528   CALCIUM 10.0   ALBUMIN 3.7   PROT 8.5*   *   K 3.4*   CO2 18*      BUN 7   CREATININE 0.8   ALKPHOS 190*   ALT 14   AST 23   BILITOT 0.8     CBC:   Recent Labs  Lab 12/11/17  1528 12/11/17  1529   WBC 12.97*  --    RBC 4.97  --    HGB 12.2  --    HCT 39.2 40   *  --    MCV 79*  --    MCH 24.5*  --    MCHC 31.1*  --      Lipid Panel:   Recent Labs  Lab 12/11/17  1528   CHOL 205*   LDLCALC 136.6   HDL 56   TRIG 62     Coagulation: No results for input(s): PT, INR, APTT in the last 168 hours.  Hgb A1C: No results for input(s): HGBA1C in the last 168 hours.  TSH:   Recent Labs  Lab 12/11/17  1528   TSH 0.124*       Diagnostic Results:      Brain  imaging:  MRI (OSH) - deep left MCA territory infarct that is acute involving left striatum and corona radiata; remote large R MCA infarct  CT ([facility] main) - on arrival, showed similar area of infarct w/o extension or evolution since MRI obtained     Vessel Imaging:  CTA (reviewed from OSH) - left M1 occlusion

## 2017-12-11 NOTE — ED NOTES
Pt was transported to IR on [facility] transfer vent. Vent plugged into electrical outlet and oxygen outlet. Sats maintained at 100%. Left phone number to be reached at.

## 2017-12-11 NOTE — ASSESSMENT & PLAN NOTE
- likely embolic / on chronic anticoagulation for Afib  - active concerns for medication non-compliance for recurrence of stroke

## 2017-12-12 PROBLEM — E87.20 LACTIC ACIDOSIS: Status: ACTIVE | Noted: 2017-01-01

## 2017-12-12 PROBLEM — R40.2430 GLASGOW COMA SCALE TOTAL SCORE 3-8: Status: ACTIVE | Noted: 2017-01-01

## 2017-12-12 NOTE — ASSESSMENT & PLAN NOTE
Continue with stroke prevention with anticoagulation pending GIB workup. No changes. Maximize risk factor modification for tertiary stroke prevention (DM, HLD, HTN, etc)

## 2017-12-12 NOTE — ASSESSMENT & PLAN NOTE
Acute left MCA M1 occlusion w/ infarct developed in deep territory w/ sparing of most of peripheral MCA territories. CT scan on arrival shows similar pattern on MRI w/o substantial evolution. No tpa d/t recent GI bleed. Plan is for IR for thrombectomy. Delay to IR d/t medical stabilization as patient was found to be intubated on arrival (no report of this prior to reaching us) w/ A.fib -170's on diltiazem gtt.     S/p successful thrombectomy with TICI 2B flow. MRI Brain with multifocal acute left MCA territory infarcts.    Antithrombotics for secondary stroke prevention: Antiplatelets:  Aspirin: 81 mg oral now and daily and w/ close monitoring of GIB w/ H/H  Statins for secondary stroke prevention and hyperlipidemia, if present: Atorvastatin- 40 mg oral daily  Aggressive risk factor modification: Hypertension  High Cholesterol  Atrial Fibrillation  Carotid Artery disease  Rehab Efforts: Physical Therapy  Occupational Therapy  Speech and Language Pathology  Physical Medicine and Rehabilitation  Diagnostics: Ordered/Pending Other: None  VTE Prophylaxis: Heparin 5000 units SQ every 8 hours   SBP <140 s/p thrombectomy

## 2017-12-12 NOTE — PROGRESS NOTES
Progress Note  Neurointerventional    Admit Date: 12/11/2017  Post-operative Day:    Hospital Day: 2    SUBJECTIVE:     Lary Sandoval is a 52 y.o. female with remote large right MCA infarct and acute left MCA infarct now 1 day status post thrombectomy.    Follow-up For:  * No surgery found *    Scheduled Meds:   chlorhexidine  15 mL Mouth/Throat BID    digoxin  0.125 mg Per G Tube Daily    diltiaZEM  60 mg Per OG tube Q6H    famotidine  20 mg Per OG tube BID    levetiracetam IVPB  500 mg Intravenous Q12H    methIMAzole  10 mg Per OG tube BID    [COMPLETED] potassium chloride  10 mEq Intravenous Q1H    sodium chloride 0.9%  3 mL Intravenous Q8H     Continuous Infusions:   dilTIAZem 15 mg/hr (12/12/17 1313)    nicardipine       PRN Meds:dextrose 50%, glucagon (human recombinant), insulin aspart, sodium chloride 0.9%  Review of patient's allergies indicates:   Allergen Reactions    Clonidine Itching and Swelling    Tramadol Itching and Swelling    Penicillins        OBJECTIVE:     Vital Signs (Most Recent)  Temp: 97.4 °F (36.3 °C) (12/12/17 1100)  Pulse: 92 (12/12/17 1337)  Resp: (!) 92 (12/12/17 1337)  BP: (!) 137/57 (12/12/17 1337)  SpO2: 100 % (12/12/17 1337)    Vital Signs Range (Last 24H):  Temp:  [97.4 °F (36.3 °C)-98.6 °F (37 °C)]   Pulse:  []   Resp:  []   BP: (112-203)/()   SpO2:  [96 %-100 %]   Arterial Line BP: (101-189)/(44-99)     I & O (Last 24H):  Intake/Output Summary (Last 24 hours) at 12/12/17 1407  Last data filed at 12/12/17 1154   Gross per 24 hour   Intake           637.34 ml   Output                0 ml   Net           637.34 ml       Physical Exam:  Intubated  Groin site clean and dry, no hematoma.    Lines/Drains:       Peripheral IV - Single Lumen 12/11/17 Right Hand (Active)   Site Assessment Dry;Intact;Clean;No redness;No swelling 12/12/2017  7:01 AM   Line Status Infusing 12/12/2017  7:01 AM   Dressing Status Clean;Dry;Intact 12/12/2017  7:01 AM   Dressing  Change Due 12/15/17 12/12/2017  7:01 AM   Site Change Due 12/15/17 12/12/2017  7:01 AM   Reason Not Rotated Not due 12/12/2017  7:01 AM   Number of days: 1            Peripheral IV - Single Lumen 12/11/17 Left Hand (Active)   Site Assessment Clean;Intact;No redness;No swelling;Dry 12/12/2017  7:01 AM   Line Status Flushed 12/12/2017  7:01 AM   Dressing Status Clean;Dry;Intact 12/12/2017  7:01 AM   Dressing Change Due 12/15/17 12/12/2017  7:01 AM   Site Change Due 12/15/17 12/12/2017  7:01 AM   Reason Not Rotated Not due 12/12/2017  7:01 AM   Number of days: 1            Arterial Line 12/11/17 1545 Right Brachial (Active)   Site Assessment Dry;Clean;No redness;No swelling;Intact 12/12/2017  7:01 AM   Line Status Pulsatile blood flow 12/12/2017  7:01 AM   Art Line Waveform Appropriate 12/12/2017  7:01 AM   Arterial Line Interventions Zeroed and calibrated;Leveled 12/12/2017  7:01 AM   Color/Movement/Sensation Capillary refill less than 3 sec 12/12/2017  7:01 AM   Dressing Type Transparent 12/12/2017  7:01 AM   Dressing Status Biopatch in place;Clean;Dry;Intact 12/12/2017  7:01 AM   Dressing Intervention New dressing 12/11/2017  4:00 PM   Dressing Change Due 12/18/17 12/12/2017  7:01 AM   Number of days: 0            NG/OG Tube 12/11/17 Right mouth (Active)   Placement Check placement verified by aspirate characteristics;placement verified by aspirate pH;placement verified by distal tube length measurement 12/12/2017  7:01 AM   Tolerance no signs/symptoms of discomfort 12/12/2017  7:01 AM   Securement taped to commercial device 12/12/2017  7:01 AM   Clamp Status/Tolerance clamped;no emesis;no abdominal distention;no residual;no restlessness 12/12/2017  7:01 AM   Suction Setting/Drainage Method dependent drainage 12/12/2017  3:55 AM   Insertion Site Appearance no redness, warmth, tenderness, skin breakdown, drainage 12/12/2017  7:01 AM   Current Rate (mL/hr) 0 mL/hr 12/11/2017  7:10 PM   Goal Rate (mL/hr) 0 mL/hr  12/11/2017  7:10 PM   Number of days: 1       Laboratory:     Recent Labs  Lab 12/11/17  1528 12/11/17  1529 12/12/17  0458   WBC 12.97*  --  19.78*   HGB 12.2  --  10.2*   HCT 39.2 40 32.9*   *  --  417*         Recent Labs  Lab 12/11/17  1528 12/12/17  0034 12/12/17  0458   *  --  135*   K 3.4* 3.5 3.5     --  106   CO2 18*  --  16*   BUN 7  --  13   CREATININE 0.8  --  0.8   CALCIUM 10.0  --  9.7       Diagnostic Results:    ASSESSMENT/PLAN:     52 year old female with large remote right MCA infarct and acute left MCA infarct now 1 day status post thrombectomy.    Groin site clean and dry, no hematoma.  Medical management per NCC.      Chu Del Rio MD  Neurointerventional Fellow  Department of Radiology  [phone]

## 2017-12-12 NOTE — PROGRESS NOTES
Notified NCC of patients decreasing MAPS below 65.  [facility] Mane Garcia NP ordered 250cc bolus. And to start titrating down Dilt drip as appropriate.       Dilt dripped is titrated off at this time.  Patients MAPS still fluctuating below 65.  Stat CT ordered but holding until MAPS stable.  Bolus is complete.  Will continue to monitor.    [facility] Jose NP also notified of patients Corneal reflexes no longer intact.

## 2017-12-12 NOTE — PT/OT/SLP PROGRESS
Physical Therapy    Patient Name:  Lary Sandoval   MRN:  [id number]  Admitting Diagnosis: Embolic stroke involving left middle cerebral artery  Recent Surgery: * No surgery found *       Physical Therapy orders received and acknowledged. Patient is intubated and appropriate for one discipline only. Occupational Therapy to follow and inform PT when appropriate for two disciplines.    Malika Aguilar PT, DPT  12/12/2017  [phone]

## 2017-12-12 NOTE — ASSESSMENT & PLAN NOTE
Noted on MRI at outside hospital, films reviewed personally. Findings consistent with early infarction.

## 2017-12-12 NOTE — PT/OT/SLP EVAL
Occupational Therapy   Evaluation/Treatment    Name: aLry Sandoval  MRN: [id number]  Admitting Diagnosis:  Embolic stroke involving left middle cerebral artery      Recommendations:     Discharge Recommendations:  (TBD once extubated)  Discharge Equipment Recommendations:   (TBD pending extubation)  Barriers to discharge:  Inaccessible home environment, Decreased caregiver support    History:     Occupational Profile:  Patient intubated and family not present; unable to obtain social history at time of eval.    Past Medical History:   Diagnosis Date    Anticoagulant long-term use     Atrial fibrillation     CHF (congestive heart failure)     COPD (chronic obstructive pulmonary disease) 4/7/2016    Coronary artery disease     Encounter for blood transfusion 4/6/2016    received 3 units blood yest    Hypertension     MI (myocardial infarction)     Stroke        Past Surgical History:   Procedure Laterality Date    APPENDECTOMY      CARDIAC CATHETERIZATION      x2 no stents    COLONOSCOPY N/A 4/8/2016    Procedure: COLONOSCOPY;  Surgeon: Roque Bobby MD;  [facility];  Service: Endoscopy;  Laterality: N/A;    knee repl;acement      pt states did not have knee repalcement,acl repair only    TUBAL LIGATION         Subjective     Patient:  Nonverbal; intubated  Communicated with:   Nurse prior to session.  Pain/Comfort:  · Pain Rating 1: 0/10  · Pain Rating Post-Intervention 1: 0/10    Objective:     Patient found with: PureWick, PICC line, ventilator, SCD, telemetry, peripheral IV, arterial line, blood pressure cuff, pressure relief boots, pulse ox (continuous) (OG tube, EEG)  Family not presnt.    General Precautions: Standard, aspiration, fall, NPO   Orthopedic Precautions:N/A   Braces: N/A     Occupational Performance:    Bed Mobility:    · Patient completed Rolling/Turning to Left with  total assistance  · Patient completed Rolling/Turning to Right with total assistance    Functional  Mobility/Transfers:  · Dependent drawsheet transfers    Activities of Daily Living:  · Feeding:  NPO  · Grooming:  Total assist while supine in bed    Cognitive/Visual Perceptual:  Cognitive/Psychosocial Skills:     -       Oriented to: Daily orientation provided  -       Follows Commands/attention:unable to follow commands  -       Communication: nonverbal; intubated    Physical Exam:  Postural examination/scapula alignment:    -       Rounded shoulders  Skin integrity: Visible skin intact  Edema:  None noted  Upper Extremity Range of Motion:     -       Right Upper Extremity: WNL PROM  -       Left Upper Extremity: WNL PROM      Patient left supine with all lines intact and call button in reach    [facility] 6 Click:  [facility] Total Score: 6    Treatment & Education:  Patient education provided on role of OT, daily orientation, ROM and positioning.  Patient unable to verbalize understanding via teach back method. Daily orientation provided.  PROM performed bilateral UE/LEs one set x 10 rep in all planes of motion with stretches provided at end range; sustained stretch provided for ankle dorsiflexion.  Assistance and facilitation provided for upward rotation of the scapula during shoulder flexion and abduction.  Patient kept eyes closed 100% of the session.  Patient unable to follow commands.  Positioning provided for midline orientation with bilateral UEs elevated and heels lifted off mattress.  Gentle cervical rotation provided.   Family not present during the session. Patient's functional status and disposition recommendation discussed with patient's nurse.  White board updated in patient's room.  OT asked if there were any other questions; patient/ family had no further questions.       Education:    Assessment:     Lary Sandoval is a 52 y.o. female with a medical diagnosis of Embolic stroke involving left middle cerebral artery.  She presents with performance deficits of physical skills including impaired  "respiration, balance, mobility, strength, dexterity, fine motor coordination, gross motor coordination, sensation and endurance; demonstrating performance deficits of cognitive skills including impaired attention, perception, understanding, problem solving, sequencing and memory all resulting in inability organizing occupational performance in a timely and safe manner; demonstrating performance deficits of psychosocial skills including impairments of interpersonal interactions and coping strategies which are skills necessary to successfully and appropriately participate in everyday tasks and social situations.  These performance deficits have resulted in activity limitations including but not limited to:   bed mobility, transfers, ascending/ descending stairs, walking short and long distances, walking around obstacles, transitional movement patterns (kneeling, bending); eating, upper body dressing, lower body dressing, brushing teeth, toileting, bathing, carrying objects, typing, writing, reading, balancing checkbook, shopping, meal preparation, and driving.   Patient's role as independent caretaker for self has been affected. Patient will benefit from skilled OT services to maximize level of independence with self-care skills and functional mobility.        Rehab Prognosis:  Fair; patient would benefit from acute skilled OT services to address these deficits and reach maximum level of function.         Clinical Decision Makin.  OT Mod:  "Pt evaluation falls under moderate complexity for evaluation coding due to identification of 3-5 performance deficits noted as stated above. Eval required Min/Mod assistance to complete on this date and detailed assessment(s) were utilized. Moreover, an expanded review of history and occupational profile obtained with additional review of cognitive, physical and psychosocial hx."     Plan:     Patient to be seen 3 x/week to address the above listed problems via " self-care/home management, therapeutic activities, therapeutic exercises, neuromuscular re-education, cognitive retraining, sensory integration  · Plan of Care Expires: 01/09/18  · Plan of Care Reviewed with: patient    GOALS:    Occupational Therapy Goals        Problem: Occupational Therapy Goal    Goal Priority Disciplines Outcome Interventions   Occupational Therapy Goal     OT, PT/OT     Description:  Goals set 12/12 to be addressed for 14 days with expiration date, 12/26:  Patient will increase functional independence with ADLs by performing:    Patient will demonstrate rolling to the right with max assist.  Not met   Patient will demonstrate rolling to the left with max assist.   Not met  Patient will demonstrate supine -sit with max assist.   Not met  Patient will demonstrate squat pivot transfers with max assist.   Not met  Patient will demonstrate grooming while seated with max assist.   Not met  Patient will demonstrate upper body dressing with max assist while seated EOB.   Not met  Patient will demonstrate lower body dressing with max assist while seated EOB.   Not met  Patient will demonstrate toileting with max assist.   Not met  Patient will demonstrate ability to follow 3/5 commands.   Not met  Patient's family / caregiver will demonstrate independence and safety with assisting patient with self-care skills and functional mobility.     Not met  Patient's family / caregiver will demonstrate independence with providing ROM and changes in bed positioning.   Not met  Patient and/or patient's family will verbalize understanding of stroke prevention guidelines, personal risk factors and stroke warning signs via teachback method.  Not met                           Time Tracking:     OT Date of Treatment: 12/12/17  OT Start Time: 0526  OT Stop Time: 0548  OT Total Time (min): 22 min    Billable Minutes:Evaluation 12  Therapeutic Exercise 10    GAIL Siddiqi  12/12/2017

## 2017-12-12 NOTE — ASSESSMENT & PLAN NOTE
Continue with secondary stroke prevention with anticoagulation pending GIB workup. No changes. Will check labs to determine if risk factor modification is adequate.

## 2017-12-12 NOTE — ASSESSMENT & PLAN NOTE
Nutrition Problem:  Inadequate energy intake    Related to (etiology):   Inability to consume sufficient energy    Signs and Symptoms (as evidenced by):   NPO, no alternative means of nutrition.      Interventions/Recommendations (treatment strategy):  Please see RD recs above.    Nutrition Diagnosis Status:   New

## 2017-12-12 NOTE — ASSESSMENT & PLAN NOTE
- Admission concerns of Afib with RVR    - On diltiazem drip for rate control / plan to oral transition with tagert HR < 100   - Initiated home digoxin / trending trops / BNPs for any active HF

## 2017-12-12 NOTE — PLAN OF CARE
Problem: Nutrition, Imbalanced: Inadequate Oral Intake (Adult)  Goal: Identify Related Risk Factors and Signs and Symptoms  Related risk factors and signs and symptoms are identified upon initiation of Human Response Clinical Practice Guideline (CPG)  Outcome: Ongoing (interventions implemented as appropriate)  Nutrition assessment completed. Please see RD note for details.    Recommendation/Intervention:   As medically appropriate, recommend starting TF.   Isosource 1.5 @ goal rate 35mL/hr with 3 packets Beneprotein daily.   -Initiate TF @ 15mL/hr and increase by 10mL q4hrs, or as tolerated, until goal rate is reached.   -Hold for residuals >500mL.     RD to monitor.       Goals: Pt to receive nutrition by RD follow up  Nutrition Goal Status: new  Communication of RD Recs: reviewed with RN

## 2017-12-12 NOTE — HPI
Lary Sandoval is a 52-year-old female with PMHx of CAD/MI, stroke, COPD, CHF, A-fib, .  Patient presented [facility] with s/p fall, general malaise, abd pain.  Stool was + occult blood and was admitted to the hospital.  While boading in the ED, she had a witnessed seizure s/p Ativan administration and was subsequently intubated.  After intubation she was noted to have R sided paralysis and was moving her left side spontaneously. A telemedicine consult was performed by [facility] who recommended full workup and Keppra was given.  She was then transferred to [facility] on 12/11 for further evaluation and management.                   Functional History: Patient lives in *** with *** in a *** story home with*** to enter.  Prior to admission, ***.  DME: ***.

## 2017-12-12 NOTE — LOPA/MORA/SWTA/AOC/AEB
Thank you for this referral. Ms. aLry Sandoval is a candidate for organ, tissue, and eye donation at this time.  Please call [facility] with any change or decline in status, if plans are made for brain death studies, or prior to any conversations with family about withdrawal of support.       Ana Ballesteros   [facility]   [phone]

## 2017-12-12 NOTE — PROGRESS NOTES
Notified NCC of patients BG of [phone].  ROSI Martinez spoken to.  Awaiting further orders. Will continue to monitor.

## 2017-12-12 NOTE — ASSESSMENT & PLAN NOTE
- 51 y/o woman with history of R MCA stroke, with residual LSW, Afib on anticoagulation (eliquis), CAD / HTN / CHF on amiodarone, CCB, HCTZ, BB, nitrate presenting as transfer from  from [facility] for acute L MCA stroke.      - CTA revealed LVO / M1, S/P IR thrombectomy with TICI 2b  - Strokeetiology, despite anti-coagulation raises concerns for non-compliance    - MRI: Multifocal acute left MCA territory infarcts in the left basal ganglia, corona radiata, and left temporal lobe as above + reperfusion injury   - MRV: No high-grade focal stenosis, occlusion, aneurysm, or vascular malformation.     - Off sedation, with poor neurological recovery. Shall continue to monitor     Plan:   - Target -140 (wean diltiazem drip and start diltiazem 60 mg q6h, HR <120)  - Holding antiplatelets / DVT prophylaxis for + reperfusion injury   - secondary stroke prophylaxis: initiate statins   - Vascular neurology on board, appreciate recs  - keppra maintanence  - close neuro monitoring

## 2017-12-12 NOTE — PLAN OF CARE
12/12/17 1358   Discharge Assessment   Assessment Type Discharge Planning Assessment   Confirmed/corrected address and phone number on facesheet? Yes   Assessment information obtained from? Patient   Expected Length of Stay (days) 7   Communicated expected length of stay with patient/caregiver yes   Prior to hospitilization cognitive status: Alert/Oriented   Prior to hospitalization functional status: Needs Assistance  (Needs assistance with bathing)   Current cognitive status: Coma/Sedated/Intubated   Current Functional Status: Completely Dependent   Lives With child(logan), adult   Able to Return to Prior Arrangements unable to determine at this time (comments)   Is patient able to care for self after discharge? Unable to determine at this time (comments)   Who are your caregiver(s) and their phone number(s)? Leslie Sandoval (Daughter) [phone], Pete Paredes  (Son) [phone]   Patient's perception of discharge disposition other (comments)  (david)   Readmission Within The Last 30 Days no previous admission in last 30 days   Patient currently being followed by outpatient case management? No   Patient currently receives any other outside agency services? No   Equipment Currently Used at Home (Per daughter patient has loop recorder x 2 yrs)   Do you have any problems affording any of your prescribed medications? No   Is the patient taking medications as prescribed? no   Does the patient have transportation home? Yes   Transportation Available family or friend will provide   Does the patient receive services at the [facility]madin Clinic? No   Discharge Plan A Skilled Nursing Facility   Discharge Plan B Rehab   Patient/Family In Agreement With Plan unable to assess       Per Daughter, patient has home health, but she is unsure of the company.         Discharge/ My Health Packet Folder Given to patient/family:      Yes        PCP: Rick Barcenas MD  Per daughter,the above MD is patient's cardiologist.  She is unsure of  PCP      Pharmacy:    [facility] - [place], [place] - [address] AT [facility] [address]  [address]  [facility] [address]  Phone: [phone] Fax: [phone]      Emergency Contacts:  Extended Emergency Contact Information  Primary Emergency Contact: JaimeHortencia  Address: [address] [place] [address] [id number] [place] [address]  Mobile Phone: [phone]  Relation: Daughter  Secondary Emergency Contact: Pete Paredes   [facility]  Home Phone: [phone]  Relation: Son      Insurance:  Payor: MEDICARE / Plan: MEDICARE PART A & B / Product Type: Chad /       Katherine Arshad RN, CCRN-K, [facility]  Neuro-Critical Care   X [phone]

## 2017-12-12 NOTE — PROGRESS NOTES
[facility]  Vascular Neurology  Comprehensive Stroke Center  Progress Note    Assessment/Plan:     * Embolic stroke involving left middle cerebral artery    Acute left MCA M1 occlusion w/ infarct developed in deep territory w/ sparing of most of peripheral MCA territories. CT scan on arrival shows similar pattern on MRI w/o substantial evolution. No tpa d/t recent GI bleed. Plan is for IR for thrombectomy. Delay to IR d/t medical stabilization as patient was found to be intubated on arrival (no report of this prior to reaching us) w/ A.fib -170's on diltiazem gtt.     S/p successful thrombectomy with TICI 2B flow. MRI Brain with multifocal acute left MCA territory infarcts.    Antithrombotics for secondary stroke prevention: Antiplatelets:  Aspirin: 81 mg oral now and daily and w/ close monitoring of GIB w/ H/H  Statins for secondary stroke prevention and hyperlipidemia, if present: Atorvastatin- 40 mg oral daily  Aggressive risk factor modification: Hypertension  High Cholesterol  Atrial Fibrillation  Carotid Artery disease  Rehab Efforts: Physical Therapy  Occupational Therapy  Speech and Language Pathology  Physical Medicine and Rehabilitation  Diagnostics: Ordered/Pending Other: None  VTE Prophylaxis: Heparin 5000 units SQ every 8 hours   SBP <140 s/p thrombectomy         Cytotoxic cerebral edema    2/2 stroke  Evident on imaging        Essential hypertension    Target SBP <140/100  Resume home antihypertensives 48-72 hours from onset.        Atrial fibrillation with RVR    Currently on diltiazem gtt, would continue with titration and adjust rate control.   SBP<140  Tachycardic overnight, Recommend HR control to augment reperfusion  Long term anticoagulation on hold currently d/t workup of current GIB initiated at OSH.        History of right MCA stroke    Continue with stroke prevention with anticoagulation pending GIB workup. No changes. Maximize risk factor modification for tertiary  stroke prevention (DM, HLD, HTN, etc)             12/12/17: s/p thrombectomy with TICI 2B flow. Tachycardic overnight. No corneals or response to painful stimuli. Intubated, on Diltiazem gtt. MRI with acute L MCA infarct.     STROKE DOCUMENTATION   Acute Stroke Times   Last Known Normal Date: 12/11/17  Last Known Normal Time: 0700  Symptom Onset Date: 12/11/17  Stroke Team Called Date: 12/11/17  Stroke Team Called Time: 1431  Stroke Team Arrival Date: 12/11/17  Stroke Team Arrival Time: 1431  CT Interpretation Time: 1446  Decision to Treat Time for IR: 1446    NIH Scale:  1a. Level Of Consciousness: 3-->Responds only with reflex motor or autonomic effects or totally unresponsive, flaccid, and areflexic  1b. LOC Questions: 2-->Answers neither question correctly  1c. LOC Commands: 2-->Performs neither task correctly  2. Best Gaze: 0-->Normal  3. Visual: 3-->Bilateral hemianopia (blind including cortical blindness)  4. Facial Palsy: 0-->Normal symmetrical movements  5a. Motor Arm, Left: 4-->No movement  5b. Motor Arm, Right: 4-->No movement  6a. Motor Leg, Left: 4-->No movement  6b. Motor Leg, Right: 4-->No movement  7. Limb Ataxia: 0-->Absent  8. Sensory: 2-->Severe to total sensory loss: patient is not aware of being touched in the face, arm, and leg  9. Best Language: 3-->Mute, global aphasia: no usable speech or auditory comprehension  10. Dysarthria: (UN) Intubated or other physical barrier  11. Extinction and Inattention (formerly Neglect): 2-->Profound jai-inattention/extinction more than 1 modality  Total (NIH Stroke Scale): 33       Modified Alcides Score: 1  Fleming Coma Scale:    ABCD2 Score:    [id number] Score:   HAS -BLED Score:   ICH Score:   Hunt & Leon Classification:      Hemorrhagic change of an Ischemic Stroke: Does this patient have an ischemic stroke with hemorrhagic changes? No     Neurologic Chief Complaint: L MCA    Subjective:     Interval History: Patient is seen for follow-up neurological  assessment and treatment recommendations: Tachycardic overnight. No corneals or response to painful stimuli. Intubated, on Diltiazem gtt. MRI with acute L MCA infarct.     HPI, Past Medical, Family, and Social History remains the same as documented in the initial encounter.     Review of Systems   Unable to perform ROS: Patient unresponsive     Scheduled Meds:   [START ON 12/13/2017] atorvastatin  80 mg Per OG tube Daily    chlorhexidine  15 mL Mouth/Throat BID    digoxin  0.125 mg Per G Tube Daily    diltiaZEM  60 mg Per OG tube Q6H    famotidine  20 mg Per OG tube BID    levetiracetam IVPB  500 mg Intravenous Q12H    methIMAzole  10 mg Per OG tube BID    sodium chloride 0.9%  3 mL Intravenous Q8H    thiamine (VITAMIN B1) IVPB  100 mg Intravenous Daily     Continuous Infusions:   dilTIAZem 15 mg/hr (12/12/17 1313)    nicardipine       PRN Meds:dextrose 50%, glucagon (human recombinant), insulin aspart, sodium chloride 0.9%    Objective:     Vital Signs (Most Recent):  Temp: 97.4 °F (36.3 °C) (12/12/17 1100)  Pulse: 93 (12/12/17 1605)  Resp: (!) 40 (12/12/17 1605)  BP: (!) 137/57 (12/12/17 1337)  SpO2: 100 % (12/12/17 1605)  BP Location: Left arm    Vital Signs Range (Last 24H):  Temp:  [97.4 °F (36.3 °C)-98.6 °F (37 °C)]   Pulse:  []   Resp:  []   BP: (112-185)/(56-92)   SpO2:  [96 %-100 %]   Arterial Line BP: ()/(44-89)   BP Location: Left arm    Physical Exam   Constitutional: She appears well-developed and well-nourished. No distress. She is intubated.   HENT:   Head: Normocephalic and atraumatic.   Cardiovascular: Tachycardia present.    Pulmonary/Chest: She is intubated.   Neurological: She is unresponsive. A cranial nerve deficit and sensory deficit is present.   Skin: Skin is warm and dry.       Neurological Exam:   EOMI with roving horizontal movements  LOC: comatose  Attention Span: comatose  Language: Intubated  Articulation: Untestable due to intubation  Visual Fields:  Bilateral visual loss/blindness  No blink to threat bilaterally  EOM (CN III, IV, VI): Full/intact  Pupils (CN II, III): PERRL  Facial Movement (CN VII): Symmetric facial expression    Motor: Bilateral hemiplegia  Sensation: Tactile extinction to bilateral simultaneous stimulation     Laboratory:  CMP:   Recent Labs  Lab 12/12/17  1304   CALCIUM 8.5*   ALBUMIN 2.3*   PROT 5.2*   *   K 4.6   CO2 29   CL 99   BUN 17   CREATININE 1.1   ALKPHOS 117   ALT 49*   AST 69*   BILITOT 0.5     CBC:   Recent Labs  Lab 12/12/17  0458   WBC 19.78*   RBC 4.14   HGB 10.2*   HCT 32.9*   *   MCV 80*   MCH 24.6*   MCHC 31.0*     Lipid Panel:   Recent Labs  Lab 12/11/17  1823   CHOL 189   LDLCALC 127.6   HDL 50   TRIG 57     Coagulation:   Recent Labs  Lab 12/11/17  1823   INR 1.2     Platelet Aggregation Study: No results for input(s): PLTAGG, PLTAGINTERP, PLTAGREGLACO, ADPPLTAGGREG in the last 168 hours.     Hgb A1C:   Recent Labs  Lab 12/11/17  1823   HGBA1C 5.7*     TSH:   Recent Labs  Lab 12/11/17  1823   TSH 0.112*       Diagnostic Results       Brain Imaging     MRI Brain 12/12/17  Multifocal acute left MCA territory infarcts in the left basal ganglia, corona radiata, and left anterior temporal lobe as above. Mild localized mass effect on the left lateral ventricle without midline shift or hydrocephalus.    CT Head 12/11/17 2309  Left corona radiata hypodensity compatible with left MCA territory infarct.  Hyperdense material within the caudate and putamen, likely represents contrast staining status post recent thrombectomy. No definite parenchymal hemorrhage.  Large remote right MCA distribution infarct with encephalomalacia.    CT Head 12/11/17 1451  Large remote right MCA distribution infarction with encephalomalacia.  Otherwise unremarkable noncontrast CT head as detailed above specifically without evidence for acute intracranial hemorrhage. Clinical correlation and further evaluation as warranted.      Vessel  Imaging     MRA Brain/Neck 12/12/17  No significant arterial abnormalities.    IR Angiogram 12/11/17  Occlusion of the left M1 segment was successfully recanalized following one pass of aspiration resulting in TICI 2B flow. There remains a small occluded branch supplying the left posterior parietal lobe.      Cardiac Imaging     2D Echo 12/12/17  CONCLUSIONS   Normal LA    1 - Mildly depressed left ventricular systolic function (EF 45-50%).     2 - Normal right ventricular systolic function .     3 - Mild aortic regurgitation.     4 - Trivial tricuspid regurgitation.     5 - No wall motion abnormalities.       Jacquelyn Mcneil PA-C  Comprehensive Stroke Center  Department of Vascular Neurology   [facility]

## 2017-12-12 NOTE — PLAN OF CARE
ICU Attending Note  Neurocritical Care    [id number]    -For thrombectomy,  *Continue Acute Stroke Intervention protocol, including stroke patient/family education  *Perform neuro and vital checks q15min x2 hours after intervention, then q30min from 2-8 hours, then q1h x24 hours  *No anticoagulants, antiplatelets, and, if possible, NGT, AL, CVC for 24 hours  *Reimage 24 hours after intervention with MR brain, MRA head/neck STAT  -levetiracetam  -atorvastatin 80 mg  -ABG  --140  -restart digoxin  -stop amiodarone  -diltiazem drip and start diltiazem 60 mg q6h, HR <120  -TTE  -Telemetry  -NS 70 mL/h  -strict I/O and Arias if necessary  -blood cultures, UA and if necessary urine culture  -Normothermia  -restart methimazole  -Glycemic control; change insulin drip to ISS  -NPO until cleared to swallow by BSS or SLP  -start TF  -PT, OT, SLP consults as appropriate  -hold heparin prophylaxis currently, on famotidine, add chlorhexidine  -activity as tolerated    ADDENDUM  Lab Results   Component Value Date    PH 7.260 (LL) 12/12/2017    PH 7.488 (H) 12/12/2017    PH 7.446 12/11/2017    PCO2 28.4 (LL) 12/12/2017    PCO2 26.9 (LL) 12/12/2017    PCO2 29.3 (L) 12/11/2017    PO2 213 (H) 12/12/2017    PO2 240 (H) 12/12/2017    PO2 143 (H) 12/11/2017    HCO3 12.7 (L) 12/12/2017    HCO3 20.4 (L) 12/12/2017    HCO3 20.2 (L) 12/11/2017    BE -14 12/12/2017    BE -3 12/12/2017    BE -4 12/11/2017     Check lactate, lytes. Stop NS. Give 2 amps NaHCO3.    ADDENDUM  Lactate >12. MR brain, MRA head/neck L MCA ischemic stroke with mild hemorrhagic conversion, R M1 occlusion. Start aspirin and heparin prophylaxis tomorrow given ICH. Start thiamine. Trend ABG, lactate. AXR for free air as alternative cause of metabolic acidosis.

## 2017-12-12 NOTE — PROCEDURES
"Lary Sandoval is a 52 y.o. female patient.    Temp: 97.8 °F (36.6 °C) (12/11/17 1910)  Pulse: 79 (12/11/17 2155)  Resp: (!) 109 (12/11/17 2155)  BP: (!) 112/59 (12/11/17 2125)  SpO2: 100 % (12/11/17 2155)  Weight: 58 kg (127 lb 13.9 oz) (12/11/17 1844)  Height: 5' 1" (154.9 cm) (12/11/17 1844)       Arterial Line  Date/Time: 12/11/2017 10:45 PM  Performed by: CHUCHO GARCIA.  Authorized by: CHUCHO GARCIA   Consent Done: Emergent Situation  Preparation: Patient was prepped and draped in the usual sterile fashion.  Indications: respiratory failure and hemodynamic monitoring  Location: right brachial  Needle gauge: 20  Seldinger technique: Seldinger technique used  Number of attempts: 2  Complications: No  Specimens: No  Implants: No  Post-procedure: dressing applied  Post-procedure CMS: normal and unchanged  Patient tolerance: Patient tolerated the procedure well with no immediate complications          Chucho Garcia  12/11/2017  "

## 2017-12-12 NOTE — PROGRESS NOTES
Report given to, [facility] float nurse in IR and to [facility] nurse for [id number].

## 2017-12-12 NOTE — PROGRESS NOTES
Patient arrived to [facility] from IR originally  From [facility]   Type of Stroke; embolic L MCA      Patients current symptoms include: no extremity movement, negative cough and gag, no response to stimuli    NCC notified of arrival

## 2017-12-12 NOTE — CONSULTS
[facility]  Vascular Neurology  Comprehensive Stroke Center  Consult Note    Inpatient consult to Vascular (Stroke) Neurology  Consult performed by: PATRICIA SHARP  Consult ordered by: LOLIS ANNE  Reason for consult: left MCA stroke        Assessment/Plan:     Patient is a 52 y.o. year old female with:    * Embolic stroke involving left middle cerebral artery    Acute left MCA M1 occlusion w/ infarct developed in deep territory w/ sparing of most of peripheral MCA territories. CT scan on arrival shows similar pattern on MRi w/o substantial evolution. No tpa d/t recent GI bleed. Plan is for IR for thrombectomy. Delay to IR d/t medical stabilization as patient was found to be intubated on arrival (no report of this prior to reaching us) w/ A.fib -170's on diltiazem gtt.   Antithrombotics for secondary stroke prevention: Antiplatelets:  Aspirin: 81 mg oral now and daily and w/ close monitoring of GIB w/ H/H  Statins for secondary stroke prevention and hyperlipidemia, if present: Atorvastatin- 40 mg oral daily  Aggressive risk factor modification: Hypertension  Atrial Fibrillation  Rehab Efforts: Physical Therapy  Occupational Therapy  Speech and Language Pathology  Diagnostics: Ordered/Pending HgbA1C to assess blood glucose levels  Lipid Profile to assess cholesterol levels  MRI head without contrast to assess brain parenchyma  Trans-thoracic cardiac echo to assess cardiac function/status  VTE Prophylaxis: Heparin 5000 units SQ every 8 hours   Permissive HTN < 220/120 depending upon reperfusion status of left M1 occlusion post thrombectomy         Cytotoxic cerebral edema    Noted on MRI at outside hospital, films reviewed personally. Findings consistent with early infarction.        History of right MCA stroke    Continue with secondary stroke prevention with anticoagulation pending GIB workup. No changes. Will check labs to determine if risk factor modification is adequate.           Essential hypertension    Permissive HTN < 220/120 until results of thrombectomy. If full reperfusion, would recommend < 160/100 as target  Resume home antihypertensives 48-72 hours from onset.        Atrial fibrillation with RVR    Currently on diltiazem gtt, would continue with titration and adjust rate control depending upon reperfusion status of left M1. If full reperfusion, would recommend a lower blood pressure target and thus more ability to aggressively target heart rate. Improved heart rate to augment reperfusion is also priority.  Long term anticoagulation on hold currently d/t workup of current GIB initiated at OSH.            STROKE DOCUMENTATION     Acute Stroke Times   Last Known Normal Date: 12/11/17  Last Known Normal Time: 0700  Symptom Onset Date: 12/11/17  Stroke Team Called Date: 12/11/17  Stroke Team Called Time: 1431  Stroke Team Arrival Date: 12/11/17  Stroke Team Arrival Time: 1431  CT Interpretation Time: 1446  Decision to Treat Time for IR: 1446    NIH Scale:  1a. Level Of Consciousness: 3-->Responds only with reflex motor or autonomic effects or totally unresponsive, flaccid, and areflexic  1b. LOC Questions: 2-->Answers neither question correctly  1c. LOC Commands: 2-->Performs neither task correctly  2. Best Gaze: 0-->Normal (roving horizontal eye movements)  3. Visual: 3-->Bilateral hemianopia (blind including cortical blindness)  4. Facial Palsy: 0-->Normal symmetrical movements  5a. Motor Arm, Left: 4-->No movement  5b. Motor Arm, Right: 4-->No movement  6a. Motor Leg, Left: 4-->No movement  6b. Motor Leg, Right: 4-->No movement  7. Limb Ataxia: 0-->Absent  8. Sensory: 1-->Mild-to-moderate sensory loss: patient feels pinprick is less sharp or is dull on the affected side: or there is a loss of superficial pain with pinprick, but patient is aware of being touched (localizes to central pain w/ attempt to reach up)  9. Best Language: 3-->Mute, global aphasia: no usable speech or  auditory comprehension  10. Dysarthria: (UN) Intubated or other physical barrier  11. Extinction and Inattention (formerly Neglect): 2-->Profound jai-inattention/extinction more than 1 modality  Total (NIH Stroke Scale): 32    Modified Wickliffe Score: 1  Tessa Coma Scale:7   ABCD2 Score:    [id number] Score:   HAS -BLED Score:   ICH Score:   Hunt & Leon Classification:       Thrombolysis Candidate? No, GI bleed      Interventional Revascularization Candidate?   Is the patient eligible for mechanical endovascular reperfusion (CASPER)?  Yes      Hemorrhagic change of an Ischemic Stroke: Does this patient have an ischemic stroke with hemorrhagic changes? No     Subjective:     History of Present Illness:  52F admitted to [facility] for w/u of general malaise and fall and c/o abdominal pain, + occult blood and planned for observation in ICU overnight for GIB f/o. While boarding in ED she was found to have a generalized seizure at ~0745 witnessed by lab draw crew, s/p Ativan which helped to break the seizure and then was subsequently intubated. After intubation she was noted to have R sided paralysis and was moving her left side spontaneously. [facility] who recommended full workup and Keppra was given.     MRI+ for deep left basal ganglia and corona radiata ischemia (ASPECTS 7) and CTA showed left M1 occlusion. Patient has history of large R MCA stroke in 2014 with apparently not much residual symptoms. She also has reported L CEA 2017 (unknown where).        Past Medical History:   Diagnosis Date    Anticoagulant long-term use     Atrial fibrillation     CHF (congestive heart failure)     COPD (chronic obstructive pulmonary disease) 4/7/2016    Coronary artery disease     Encounter for blood transfusion 4/6/2016    received 3 units blood yest    Hypertension     MI (myocardial infarction)     Stroke      Past Surgical History:   Procedure Laterality Date     APPENDECTOMY      CARDIAC CATHETERIZATION      x2 no stents    COLONOSCOPY N/A 4/8/2016    Procedure: COLONOSCOPY;  Surgeon: Roque Bobby MD;  [facility];  Service: Endoscopy;  Laterality: N/A;    knee repl;acement      pt states did not have knee repalcement,acl repair only    TUBAL LIGATION       Family History   Problem Relation Age of Onset    Hypertension Mother     Stroke Father     Hypertension Father      Social History   Substance Use Topics    Smoking status: Former Smoker     Packs/day: 0.25     Types: Cigarettes     Quit date: 1/26/2017    Smokeless tobacco: Never Used    Alcohol use Not on file     Review of patient's allergies indicates:   Allergen Reactions    Clonidine Itching and Swelling    Tramadol Itching and Swelling    Penicillins        Medications: I have reviewed the current medication administration record.      (Not in a hospital admission)    Review of Systems   Reason unable to perform ROS: ROS obtained from outside records    Constitutional: Negative for appetite change.   HENT: Negative for congestion.    Eyes: Negative for discharge.   Respiratory: Negative for shortness of breath.    Cardiovascular: Negative for chest pain.   Gastrointestinal: Positive for abdominal pain.        Per outside reports prior to intubation     Endocrine: Negative for cold intolerance.   Genitourinary: Negative for difficulty urinating.   Musculoskeletal: Negative for joint swelling.   Skin: Negative for color change.     Objective:     Vital Signs (Most Recent):  Temp: 97.6 °F (36.4 °C) (12/11/17 1455)  Pulse: (!) 122 (12/11/17 1730)  Resp: 20 (12/11/17 1730)  BP: (!) 184/76 (12/11/17 1730)  SpO2: 96 % (12/11/17 1730)    Vital Signs Range (Last 24H):  Temp:  [97.6 °F (36.4 °C)]   Pulse:  [112-147]   Resp:  [15-41]   BP: (164-203)/()   SpO2:  [96 %-100 %]   Arterial Line BP: (154-189)/(77-99)     Physical Exam   Constitutional: No distress.   HENT:   Head:  Normocephalic.   Right Ear: External ear normal.   Left Ear: External ear normal.   Eyes: Conjunctivae are normal.   Neck: Normal range of motion.   Cardiovascular:   Tachycardia, irregular rhythm   Pulmonary/Chest: Effort normal. No stridor.   Abdominal: There is no tenderness.   Skin: Skin is dry. No rash noted.       Neurological Exam:   PERRL, EOMI with roving horizontal movements  + corneal reflexes  LOC: comatose  Attention Span: comatose  Language: Intubated  Articulation: Untestable due to intubation  Visual Fields: Bilateral visual loss/blindness  no bTT  EOM (CN III, IV, VI): Full/intact  Pupils (CN II, III): PERRL  Motor: R hemiplegia; left UE/LE respond to central painful stimulus w/ antigravity in LUE  Sensation: responds to central painful stimulus with reaching up with left UE       Laboratory:  CMP:   Recent Labs  Lab 12/11/17  1528   CALCIUM 10.0   ALBUMIN 3.7   PROT 8.5*   *   K 3.4*   CO2 18*      BUN 7   CREATININE 0.8   ALKPHOS 190*   ALT 14   AST 23   BILITOT 0.8     CBC:   Recent Labs  Lab 12/11/17  1528 12/11/17  1529   WBC 12.97*  --    RBC 4.97  --    HGB 12.2  --    HCT 39.2 40   *  --    MCV 79*  --    MCH 24.5*  --    MCHC 31.1*  --      Lipid Panel:   Recent Labs  Lab 12/11/17  1528   CHOL 205*   LDLCALC 136.6   HDL 56   TRIG 62     Coagulation: No results for input(s): PT, INR, APTT in the last 168 hours.  Hgb A1C: No results for input(s): HGBA1C in the last 168 hours.  TSH:   Recent Labs  Lab 12/11/17  1528   TSH 0.124*       Diagnostic Results:      Brain imaging:  MRI (OSH) - deep left MCA territory infarct that is acute involving left striatum and corona radiata; remote large R MCA infarct  CT ([facility] main) - on arrival, showed similar area of infarct w/o extension or evolution since MRI obtained     Vessel Imaging:  CTA (reviewed from OSH) - left M1 occlusion      Staff physician involved in 0.75 hours of critical care time and in-person contact during the  following time: 1431 until 1515.    This patient has a critical neurological condition/illness, with high morbidity and mortality.  There is high probability for acute neurological change leading to clinical and possibly life-threatening deterioration requiring highest level of physician preparedness for urgent intervention.    Continuous in-person monitoring of the patient was done, including vital signs, neurological status, NIHSS or ICP monitoring with ongoing treatment changes based on patient need.    Care was coordinated with other physicians involved in the patient's care.    Radiologic studies and laboratory data were reviewed and interpreted, and plan of care was re-assessed based on the results.    Diagnosis, treatment options and prognosis were discussed with the patient and/or family members or caregiver.      Todd Toledo MD  Comprehensive Stroke Center  Department of Vascular Neurology   [facility]

## 2017-12-12 NOTE — SUBJECTIVE & OBJECTIVE
Interval History:     Review of Systems   Unable to perform ROS: Intubated   Constitutional: Negative for fever.   Neurological: Negative for seizures.     Objective:     Vitals:  Temp: 97.4 °F (36.3 °C) (12/12/17 1100)  Pulse: 93 (12/12/17 1605)  Resp: (!) 40 (12/12/17 1605)  BP: (!) 137/57 (12/12/17 1337)  SpO2: 100 % (12/12/17 1605)    Temp:  [97.4 °F (36.3 °C)-98.6 °F (37 °C)] 97.4 °F (36.3 °C)  Pulse:  [] 93  Resp:  [] 40  SpO2:  [96 %-100 %] 100 %  BP: (112-185)/() 137/57  Arterial Line BP: ()/(44-99) 92/51         Vent Mode: A/C  Oxygen Concentration (%):  [40-50] 40  Resp Rate Total:  [17 br/min-28 br/min] 19 br/min  Vt Set:  [400 mL] 400 mL  PEEP/CPAP:  [5 cmH20] 5 cmH20  Pressure Support:  [0 cmH20] 0 cmH20  Mean Airway Pressure:  [2.6 qmA33-82 cmH20] 16 cmH20    12/11 0701 - 12/12 0700  In: 508.6 [I.V.:208.6]  Out: -     Physical Exam   HENT:   Head: Normocephalic and atraumatic.   Intubated    Eyes: EOM are normal. Pupils are equal, round, and reactive to light.   Neck: Neck supple.   Cardiovascular:   Rate controlled / fluctuation to RVR +   Rhythm: irregular    Pulmonary/Chest:   A/C vent settings / breath above vent +    Abdominal: Soft.   Neurological: She is unresponsive. She displays no atrophy. A sensory deficit is present. No cranial nerve deficit. She displays no seizure activity.   Reflex Scores:       Tricep reflexes are 3+ on the right side and 3+ on the left side.       Bicep reflexes are 3+ on the right side and 3+ on the left side.       Brachioradialis reflexes are 3+ on the right side and 3+ on the left side.       Patellar reflexes are 3+ on the right side and 3+ on the left side.  Intubated / off sedation / no response to verbal or painful stimuli   - PERRLA + / Sluggish but + corneal / gag  - Motor UE/LE B/L 1/5 (no spontaneous movements)   - No response to pain        Unable to test orientation, language, memory, judgment, insight, fund of knowledge, hearing,  shoulder shrug, tongue protrusion, coordination, gait due to level of consciousness.    Medications:  Continuous  dilTIAZem Last Rate: 15 mg/hr (12/12/17 1313)   nicardipine    Scheduled  chlorhexidine 15 mL BID   digoxin 0.125 mg Daily   diltiaZEM 60 mg Q6H   famotidine 20 mg BID   levetiracetam IVPB 500 mg Q12H   methIMAzole 10 mg BID   sodium chloride 0.9% 3 mL Q8H   thiamine (VITAMIN B1) IVPB 100 mg Daily   PRN  dextrose 50% 12.5 g PRN   glucagon (human recombinant) 1 mg PRN   insulin aspart 1-10 Units Q6H PRN   sodium chloride 0.9% 5 mL PRN     Today I personally reviewed pertinent medications, lines/drains/airways, imaging, cardiology, lab results, microbiology results, notably:

## 2017-12-12 NOTE — CONSULTS
Inpatient consult to Physical Medicine Rehab  Consult performed by: LUPIS VILLARREAL  Consult ordered by: LOLIS ANNE  Reason for consult: assess rehab needs        Patient is intubated and too acute.  Will sign off.  Please re-consult when patient is medically stable, participating with therapy, and ready for discharge.      LADONNA Varma, ARJUNP-C  Physical Medicine & Rehabilitation   12/12/2017  Spectralink: [id number]

## 2017-12-12 NOTE — PROGRESS NOTES
Notified NCC of patients HR jumping up to 170's and sustaining 130s.  Dilt drip maxed out for greater than 30 minutes. Started pt on PO amio Per Rocky CHAPMAN.  Will continue to monitor.

## 2017-12-12 NOTE — ASSESSMENT & PLAN NOTE
- permissive HTN prior to IR procedure    - HTN target < 140 SBP, for + reperfusion injury   - Maintenance with diltiazem, iv to oral

## 2017-12-12 NOTE — SUBJECTIVE & OBJECTIVE
Neurologic Chief Complaint: L MCA    Subjective:     Interval History: Patient is seen for follow-up neurological assessment and treatment recommendations: Tachycardic overnight. No corneals or response to painful stimuli. Intubated, on Diltiazem gtt. MRI with acute L MCA infarct.     HPI, Past Medical, Family, and Social History remains the same as documented in the initial encounter.     Review of Systems   Unable to perform ROS: Patient unresponsive     Scheduled Meds:   [START ON 12/13/2017] atorvastatin  80 mg Per OG tube Daily    chlorhexidine  15 mL Mouth/Throat BID    digoxin  0.125 mg Per G Tube Daily    diltiaZEM  60 mg Per OG tube Q6H    famotidine  20 mg Per OG tube BID    levetiracetam IVPB  500 mg Intravenous Q12H    methIMAzole  10 mg Per OG tube BID    sodium chloride 0.9%  3 mL Intravenous Q8H    thiamine (VITAMIN B1) IVPB  100 mg Intravenous Daily     Continuous Infusions:   dilTIAZem 15 mg/hr (12/12/17 1313)    nicardipine       PRN Meds:dextrose 50%, glucagon (human recombinant), insulin aspart, sodium chloride 0.9%    Objective:     Vital Signs (Most Recent):  Temp: 97.4 °F (36.3 °C) (12/12/17 1100)  Pulse: 93 (12/12/17 1605)  Resp: (!) 40 (12/12/17 1605)  BP: (!) 137/57 (12/12/17 1337)  SpO2: 100 % (12/12/17 1605)  BP Location: Left arm    Vital Signs Range (Last 24H):  Temp:  [97.4 °F (36.3 °C)-98.6 °F (37 °C)]   Pulse:  []   Resp:  []   BP: (112-185)/(56-92)   SpO2:  [96 %-100 %]   Arterial Line BP: ()/(44-89)   BP Location: Left arm    Physical Exam   Constitutional: She appears well-developed and well-nourished. No distress. She is intubated.   HENT:   Head: Normocephalic and atraumatic.   Cardiovascular: Tachycardia present.    Pulmonary/Chest: She is intubated.   Neurological: She is unresponsive. A cranial nerve deficit and sensory deficit is present.   Skin: Skin is warm and dry.       Neurological Exam:   EOMI with roving horizontal movements  LOC:  comatose  Attention Span: comatose  Language: Intubated  Articulation: Untestable due to intubation  Visual Fields: Bilateral visual loss/blindness  No blink to threat bilaterally  EOM (CN III, IV, VI): Full/intact  Pupils (CN II, III): PERRL  Facial Movement (CN VII): Symmetric facial expression    Motor: Bilateral hemiplegia  Sensation: Tactile extinction to bilateral simultaneous stimulation     Laboratory:  CMP:   Recent Labs  Lab 12/12/17  1304   CALCIUM 8.5*   ALBUMIN 2.3*   PROT 5.2*   *   K 4.6   CO2 29   CL 99   BUN 17   CREATININE 1.1   ALKPHOS 117   ALT 49*   AST 69*   BILITOT 0.5     CBC:   Recent Labs  Lab 12/12/17  0458   WBC 19.78*   RBC 4.14   HGB 10.2*   HCT 32.9*   *   MCV 80*   MCH 24.6*   MCHC 31.0*     Lipid Panel:   Recent Labs  Lab 12/11/17  1823   CHOL 189   LDLCALC 127.6   HDL 50   TRIG 57     Coagulation:   Recent Labs  Lab 12/11/17  1823   INR 1.2     Platelet Aggregation Study: No results for input(s): PLTAGG, PLTAGINTERP, PLTAGREGLACO, ADPPLTAGGREG in the last 168 hours.     Hgb A1C:   Recent Labs  Lab 12/11/17  1823   HGBA1C 5.7*     TSH:   Recent Labs  Lab 12/11/17  1823   TSH 0.112*       Diagnostic Results       Brain Imaging     MRI Brain 12/12/17  Multifocal acute left MCA territory infarcts in the left basal ganglia, corona radiata, and left anterior temporal lobe as above. Mild localized mass effect on the left lateral ventricle without midline shift or hydrocephalus.    CT Head 12/11/17 2309  Left corona radiata hypodensity compatible with left MCA territory infarct.  Hyperdense material within the caudate and putamen, likely represents contrast staining status post recent thrombectomy. No definite parenchymal hemorrhage.  Large remote right MCA distribution infarct with encephalomalacia.    CT Head 12/11/17 1451  Large remote right MCA distribution infarction with encephalomalacia.  Otherwise unremarkable noncontrast CT head as detailed above specifically without  evidence for acute intracranial hemorrhage. Clinical correlation and further evaluation as warranted.      Vessel Imaging     MRA Brain/Neck 12/12/17  No significant arterial abnormalities.    IR Angiogram 12/11/17  Occlusion of the left M1 segment was successfully recanalized following one pass of aspiration resulting in TICI 2B flow. There remains a small occluded branch supplying the left posterior parietal lobe.      Cardiac Imaging     2D Echo 12/12/17  CONCLUSIONS   Normal LA    1 - Mildly depressed left ventricular systolic function (EF 45-50%).     2 - Normal right ventricular systolic function .     3 - Mild aortic regurgitation.     4 - Trivial tricuspid regurgitation.     5 - No wall motion abnormalities.

## 2017-12-12 NOTE — HOSPITAL COURSE
12/12/17: s/p thrombectomy with TICI 2B flow. Tachycardic overnight. No corneals or response to painful stimuli. Intubated, on Diltiazem gtt. MRI with acute L MCA infarct.

## 2017-12-12 NOTE — PROCEDURES
ICU EEG/VIDEO MONITORING REPORT    Lary Sandoval  [id number]  1965    DATE OF SERVICE: 12/11-12/17    DATE OF ADMISSION: 12/11/2017  2:53 PM    ADMITTING PROVIDER: Linus Cool MD    REASON FOR CONSULT: 53yo F with multiple medical co-morbidities, including prior R MCA stroke, admitted with new L MCA stroke.    MEDICATIONS:   Current Facility-Administered Medications   Medication    amiodarone tablet 200 mg    dextrose 50% injection 12.5 g    dextrose 50% injection 25 g    diltiaZEM 125 mg in D5W 125 mL infusion    famotidine tablet 20 mg    insulin regular (Humulin R) 100 Units in sodium chloride 0.9% 100 mL infusion    levETIRAcetam in NaCl (iso-os) IVPB 500 mg    magnesium sulfate 3 g in dextrose 5 % 250 mL IVPB    niCARdipine 40 mg/200 mL infusion    sodium chloride 0.9% flush 3 mL    sodium chloride 0.9% flush 5 mL     METHODOLOGY-cap   Electroencephalographic (EEG) recording is with electrodes placed according to the International 10-20 placement system.  Thirty two (32) channels of digital signal are simultaneously recorded from the scalp and may include EKG, EMG, and/or eye monitors.   Recording band pass was 0.1 to 512 hz.  Digital video recording of the patient is simultaneously recorded with the EEG.  The nursing staff report clinical symptoms and may press an event button when the patient has symptoms of clinical interest to the treating physicians.  EEG and video recording is stored and archived in digital format.  The entire recording is visually reviewed and the times identified by computer analysis as being spikes or seizures are reviewed again.  Activation procedures which include photic stimulation, hyperventilation and instructing patients to perform simple task are done in selected patients.   Compresses spectral analysis (CSA) is also performed on the activity recorded from each individual channel.  This is displayed as a power display of frequencies from 0 to 30 Hz over  time.   The CSA analysis is done and displayed continuously.  This is reviewed for asymmetries in power between homologous areas of the scalp and for presence of changes in power which canbe seen when seizures occur.  Sections of suspected abnormalities on the CSA is then compared with the original EEG recording.     [employer] software was also utilized in the review of this study.  This software suite analyzes the EEG recording in multiple domains.  Coherence and rhythmicity is computed to identify EEG sections which may contain organized seizures.  Each channel undergoes analysis to detect presence of spike and sharp waves which have special and morphological characteristic of epileptic activity.  The routine EEG recording is converted from spacial into frequency domain.  This is then displayed comparing homologous areas to identify areas of significant asymmetry.  Algorithm to identify non-cortically generated artifact is used to separate eye movement, EMG and other artifact from the EEG.      Recording Times  Start on 12/11/17, 18:45  Stop on 12/12/17, 08:14    Break: 22:46 - 01:09 = 2:23  A total of 11 hours and 04 minutes of EEG was recorded.    EEG FINDINGS  Background activity:   The background rhythm was characterized by delta-theta (2-6 Hz) activity   No posterior dominant rhythm seen.   Symmetry and continuity: the background was continuous and symmetric; poor right sided contacts noted in the latter part of the study    Sleep:   Not seen.    Activation procedures:   Not done    Abnormal activity:   No epileptiform discharges, periodic discharges, lateralized rhythmic delta activity or electrographic seizures were seen in the interpretable portions.    IMPRESSION:   This is an abnormal cap-EEG due to the moderate to severe generalized slowing seen, suggestive of moderate to severe diffuse or multifocal cerebral dysfunction.    No epileptiform activity or electrographic seizures were seen in the  interpretable portions.    CLINICAL CORRELATION IS RECOMMENDED.    Radha Mary MD, WON(), [facility].  Neurology-Epilepsy.

## 2017-12-12 NOTE — PLAN OF CARE
Problem: Occupational Therapy Goal  Goal: Occupational Therapy Goal  OT evaluation initiated.  GAIL Siddiqi  12/12/2017

## 2017-12-12 NOTE — PLAN OF CARE
Problem: Patient Care Overview  Goal: Plan of Care Review  Outcome: Ongoing (interventions implemented as appropriate)  POC reviewed with pt at 0500. Pt UNABLE TO verbalized understanding. Questions and concerns of son and daughter addressed via phone. Patients HR remains uncontrolled over night despite dilt drip and restarting home Amio PO. CT completed. Bath completed.  EEG cap still on.  dilt @ 15, Insuline drip at 1.1. Will continue to monitor. See flowsheets for full assessment and VS info.

## 2017-12-12 NOTE — PROGRESS NOTES
Transported patient to CT with RT and PCT.  Patient tolerated transport well. Patient placed back on EEG.  Will continue to monitor.

## 2017-12-12 NOTE — PROGRESS NOTES
[facility]  Neurocritical Care  Progress Note    Admit Date: 12/11/2017  Service Date: 12/12/2017  Length of Stay: 1    Subjective:     Chief Complaint: Embolic stroke involving left middle cerebral artery    History of Present Illness: Lary Cardozo is a 51 y/o woman with medical history of R MCA stroke, with residual LSW, Afib on anticoagulation (eliquis), CAD / HTN / CHF on amiodarone, CCB, HCTZ, BB, nitrate presenting as transfer from    from [facility] for acute L MCA stroke.     History per records. Patient presented with weakness and fall with abdominal pain. Admitted for suspicion for GIB, however while in ED had a seizure, required intubation. CT showed acute L MCA occlusion.     At [facility], CTA revealed LVO / M1 and admitted for neurointerventional procedure / NICU close monitoring.     Hospital Course: 12/11: CTA L M1 occlusion. IR procedure. Afib with RVR, management with diltiazem.   12/12: S/p M1 thombectomy / TICI 2B / + Reperfusion injury     Interval History:     Review of Systems   Unable to perform ROS: Intubated   Constitutional: Negative for fever.   Neurological: Negative for seizures.     Objective:     Vitals:  Temp: 97.4 °F (36.3 °C) (12/12/17 1100)  Pulse: 93 (12/12/17 1605)  Resp: (!) 40 (12/12/17 1605)  BP: (!) 137/57 (12/12/17 1337)  SpO2: 100 % (12/12/17 1605)    Temp:  [97.4 °F (36.3 °C)-98.6 °F (37 °C)] 97.4 °F (36.3 °C)  Pulse:  [] 93  Resp:  [] 40  SpO2:  [96 %-100 %] 100 %  BP: (112-185)/() 137/57  Arterial Line BP: ()/(44-99) 92/51         Vent Mode: A/C  Oxygen Concentration (%):  [40-50] 40  Resp Rate Total:  [17 br/min-28 br/min] 19 br/min  Vt Set:  [400 mL] 400 mL  PEEP/CPAP:  [5 cmH20] 5 cmH20  Pressure Support:  [0 cmH20] 0 cmH20  Mean Airway Pressure:  [2.6 hxF36-39 cmH20] 16 cmH20    12/11 0701 - 12/12 0700  In: 508.6 [I.V.:208.6]  Out: -     Physical Exam   HENT:   Head: Normocephalic and atraumatic.    Intubated    Eyes: EOM are normal. Pupils are equal, round, and reactive to light.   Neck: Neck supple.   Cardiovascular:   Rate controlled / fluctuation to RVR +   Rhythm: irregular    Pulmonary/Chest:   A/C vent settings / breath above vent +    Abdominal: Soft.   Neurological: She is unresponsive. She displays no atrophy. A sensory deficit is present. No cranial nerve deficit. She displays no seizure activity.   Reflex Scores:       Tricep reflexes are 3+ on the right side and 3+ on the left side.       Bicep reflexes are 3+ on the right side and 3+ on the left side.       Brachioradialis reflexes are 3+ on the right side and 3+ on the left side.       Patellar reflexes are 3+ on the right side and 3+ on the left side.  Intubated / off sedation / no response to verbal or painful stimuli   - PERRLA + / Sluggish but + corneal / gag  - Motor UE/LE B/L 1/5 (no spontaneous movements)   - No response to pain        Unable to test orientation, language, memory, judgment, insight, fund of knowledge, hearing, shoulder shrug, tongue protrusion, coordination, gait due to level of consciousness.    Medications:  Continuous  dilTIAZem Last Rate: 15 mg/hr (12/12/17 1313)   nicardipine    Scheduled  chlorhexidine 15 mL BID   digoxin 0.125 mg Daily   diltiaZEM 60 mg Q6H   famotidine 20 mg BID   levetiracetam IVPB 500 mg Q12H   methIMAzole 10 mg BID   sodium chloride 0.9% 3 mL Q8H   thiamine (VITAMIN B1) IVPB 100 mg Daily   PRN  dextrose 50% 12.5 g PRN   glucagon (human recombinant) 1 mg PRN   insulin aspart 1-10 Units Q6H PRN   sodium chloride 0.9% 5 mL PRN     Today I personally reviewed pertinent medications, lines/drains/airways, imaging, cardiology, lab results, microbiology results, notably:        Assessment/Plan:     Neuro   * Embolic stroke involving left middle cerebral artery    - 53 y/o woman with history of R MCA stroke, with residual LSW, Afib on anticoagulation (eliquis), CAD / HTN / CHF on amiodarone, CCB, HCTZ,  BB, nitrate presenting as transfer from  from [facility] for acute L MCA stroke.      - CTA revealed LVO / M1, S/P IR thrombectomy with TICI 2b  - Strokeetiology, despite anti-coagulation raises concerns for non-compliance    - MRI: Multifocal acute left MCA territory infarcts in the left basal ganglia, corona radiata, and left temporal lobe as above + reperfusion injury   - MRV: No high-grade focal stenosis, occlusion, aneurysm, or vascular malformation.     - Off sedation, with poor neurological recovery. Shall continue to monitor     Plan:   - Target -140 (wean diltiazem drip and start diltiazem 60 mg q6h, HR <120)  - Holding antiplatelets / DVT prophylaxis for + reperfusion injury   - secondary stroke prophylaxis: initiate statins   - Vascular neurology on board, appreciate recs  - keppra maintanence  - close neuro monitoring         Cytotoxic cerebral edema    - due to stroke         History of right MCA stroke    - likely embolic / on chronic anticoagulation for Afib  - active concerns for medication non-compliance for recurrence of stroke         Pulmonary   COPD (chronic obstructive pulmonary disease)    - intubated  - F/u CXR : Lungs appear well-aerated without focal consolidation. No pneumothorax.        Cardiac/Vascular   CHF (congestive heart failure)    - H/o CHF, Unclear baseline    - F/u Mildly depressed left ventricular systolic function (EF 45-50%).   - F/u trop / bnp elevated   - continuous tele / strict I/O  - Reinitiated home digoxin / maintenance on CCB        S/P carotid endarterectomy    - no active concerns         Essential hypertension    - permissive HTN prior to IR procedure    - HTN target < 140 SBP, for + reperfusion injury   - Maintenance with diltiazem, iv to oral         Atrial fibrillation with RVR    - Admission concerns of Afib with RVR    - On diltiazem drip for rate control / plan to oral transition with tagert HR < 100   - Initiated home digoxin / trending  trops / BNPs for any active HF         Renal/   Lactic acidosis    - lactate > 12    - unclear etiology   - trend lactate / f/u AXR  - MA correction / ABGs  as needed   - F/u UA / BC for infectious concerns for leucocytosis         Hypokalemia    - correction with IV meds  - target K > 4             Prophylaxis:  Venous Thromboembolism: mechanical  Stress Ulcer: H2B  Ventilator Pneumonia: no     Activity Orders          None        Full Code    Silvia Jiménez MD  Neurocritical Care  [facility]

## 2017-12-12 NOTE — PROGRESS NOTES
Pt tx via stretcher to IR, all monitoring continued.  VSS as noted, pt tolerated well.  Tx to IR table and all monitoring continued.

## 2017-12-12 NOTE — ASSESSMENT & PLAN NOTE
Currently on diltiazem gtt, would continue with titration and adjust rate control.   SBP<140  Tachycardic overnight, Recommend HR control to augment reperfusion  Long term anticoagulation on hold currently d/t workup of current GIB initiated at OSH.

## 2017-12-12 NOTE — CONSULTS
"  [facility]  Adult Nutrition  Consult Note    SUMMARY     Recommendations    Recommendation/Intervention:   As medically appropriate, recommend starting TF.   Isosource 1.5 @ goal rate 35mL/hr with 3 packets Beneprotein daily.   -Initiate TF @ 15mL/hr and increase by 10mL q4hrs, or as tolerated, until goal rate is reached.   -Hold for residuals >500mL.     RD to monitor.       Goals: Pt to receive nutrition by RD follow up  Nutrition Goal Status: new  Communication of RD Recs: reviewed with RN    Reason for Assessment    Reason for Assessment: physician consult  Diagnosis: stroke/CVA  Relevent Medical History: a fib, CHF, CAD, HTN, MCA         General Information Comments: Pt intubated. No family at bedside.     Nutrition Discharge Planning: unable to determine at this time    Nutrition Prescription Ordered    Current Diet Order: NPO     Evaluation of Received Nutrients/Fluid Intake     I/O: +I/O      % Intake of Estimated Energy Needs: 0 - 25 %  % Meal Intake: NPO     Nutrition Risk Screen     Nutrition Risk Screen: no indicators present    Nutrition/Diet History       Typical Food/Fluid Intake: MAXINE intake PTA. Pt remains NPO on [facility] vent.  Food Preferences: MAXINE [employer]/cultural food preferences at this time        Factors Affecting Nutritional Intake: NPO, on mechanical ventilation                Labs/Tests/Procedures/Meds       Pertinent Labs Reviewed: reviewed  Pertinent Labs Comments: Na 135, HgbA1c 5.7, POCt Glu 187-220  Pertinent Medications Reviewed: reviewed  Pertinent Medications Comments: insulin, cardene    Physical Findings    Overall Physical Appearance: nourished, on ventilator support  Tubes: orogastric tube     Skin: intact    Anthropometrics    Temp: 98.6 °F (37 °C)     Height: 5' 1" (154.9 cm)  Weight Method: Bed Scale  Weight: 58 kg (127 lb 13.9 oz)     Ideal Body Weight (IBW), Female: 105 lb     % Ideal Body Weight, Female (lb): 121.78 lb  BMI (Calculated): 24.2  BMI " Grade: 18.5-24.9 - normal                            Estimated/Assessed Needs    Weight Used For Calorie Calculations: 58 kg (127 lb 13.9 oz)      Energy Calorie Requirements (kcal): 1237  Energy Need Method: [place]        RMR (Osteen-St. Jeor Equation): 1127.38        Weight Used For Protein Calculations: 58 kg (127 lb 13.9 oz)  Protein Requirements: 70-87g (1.2-1.5g/kg)    Fluid Requirements (mL): 1mL/kcal or per MD  RDA Method (mL): 1237               Assessment and Plan    S/P carotid endarterectomy    Nutrition Problem:  Inadequate energy intake    Related to (etiology):   Inability to consume sufficient energy    Signs and Symptoms (as evidenced by):   NPO, no alternative means of nutrition.      Interventions/Recommendations (treatment strategy):  Please see RD recs above.    Nutrition Diagnosis Status:   New              Monitor and Evaluation    Food and Nutrient Intake: enteral nutrition intake  Food and Nutrient Adminstration: enteral and parenteral nutrition administration        Anthropometric Measurements: weight, body mass index, weight change  Biochemical Data, Medical Tests and Procedures: gastrointestinal profile, electrolyte and renal panel, glucose/endocrine profile, lipid profile, inflammatory profile  Nutrition-Focused Physical Findings: overall appearance    Nutrition Risk    Level of Risk: other (see comments) (f/u 2x/week)    Nutrition Follow-Up    RD Follow-up?: Yes

## 2017-12-12 NOTE — ASSESSMENT & PLAN NOTE
- H/o CHF, Unclear baseline    - F/u Mildly depressed left ventricular systolic function (EF 45-50%).   - F/u trop / bnp elevated   - continuous tele / strict I/O  - Reinitiated home digoxin / maintenance on CCB

## 2017-12-12 NOTE — ASSESSMENT & PLAN NOTE
- lactate > 12    - unclear etiology   - trend lactate / f/u AXR  - MA correction / ABGs  as needed   - F/u UA / BC for infectious concerns for leucocytosis

## 2017-12-12 NOTE — PT/OT/SLP PROGRESS
Speech Language Pathology discharge note      Lary Sandoval  MRN: [id number]    Patient not seen today secondary to Other (Comment) (pt intubated). Pt reconsult speech tx when/if appropriate.     KWAN Wei, CCC-SLP  12/12/2017

## 2017-12-13 PROBLEM — A41.9 SEPTIC SHOCK: Status: ACTIVE | Noted: 2017-01-01

## 2017-12-13 PROBLEM — D62 ACUTE BLOOD LOSS ANEMIA: Status: ACTIVE | Noted: 2017-01-01

## 2017-12-13 PROBLEM — I46.9 CARDIAC ARREST: Status: ACTIVE | Noted: 2017-01-01

## 2017-12-13 PROBLEM — R73.9 STRESS HYPERGLYCEMIA: Status: ACTIVE | Noted: 2017-01-01

## 2017-12-13 PROBLEM — D65 DIC (DISSEMINATED INTRAVASCULAR COAGULATION): Status: ACTIVE | Noted: 2017-01-01

## 2017-12-13 PROBLEM — R65.21 SEPTIC SHOCK: Status: ACTIVE | Noted: 2017-01-01

## 2017-12-13 PROBLEM — J96.00 ACUTE RESPIRATORY FAILURE: Status: ACTIVE | Noted: 2017-01-01

## 2017-12-13 PROBLEM — K55.9 ISCHEMIC COLITIS, ENTERITIS, OR ENTEROCOLITIS: Status: ACTIVE | Noted: 2017-01-01

## 2017-12-13 PROBLEM — E05.90 HYPERTHYROIDISM: Status: ACTIVE | Noted: 2017-01-01

## 2017-12-13 PROBLEM — J96.01 ACUTE RESPIRATORY FAILURE WITH HYPOXIA: Status: ACTIVE | Noted: 2017-01-01

## 2017-12-13 PROBLEM — N17.9 AKI (ACUTE KIDNEY INJURY): Status: ACTIVE | Noted: 2017-01-01

## 2017-12-13 PROBLEM — K92.2 UGIB (UPPER GASTROINTESTINAL BLEED): Status: ACTIVE | Noted: 2017-01-01

## 2017-12-13 PROBLEM — K72.00 SHOCK LIVER: Status: ACTIVE | Noted: 2017-01-01

## 2017-12-13 NOTE — PROCEDURES
"Lary Sandoval is a 52 y.o. female patient.    Temp: 97.4 °F (36.3 °C) (12/12/17 1100)  Pulse: 72 (12/12/17 2112)  Resp: (!) 76 (12/12/17 2112)  BP: 100/73 (12/12/17 2052)  SpO2: (!) 77 % (12/12/17 2052)  Weight: 58 kg (127 lb 13.9 oz) (12/11/17 1905)  Height: 5' 1" (154.9 cm) (12/11/17 1905)       Arterial Line  Date/Time: 12/12/2017 9:17 PM  Performed by: CHUCHO GARCIA  Authorized by: CHUCHO GARCIA   Preparation: Patient was prepped and draped in the usual sterile fashion.  Indications: multiple ABGs, respiratory failure and hemodynamic monitoring  Location: left femoral  Needle gauge: 20  Number of attempts: 3  Complications: No  Specimens: No  Implants: No  Post-procedure: line sutured and dressing applied  Post-procedure CMS: normal  Patient tolerance: Patient tolerated the procedure well with no immediate complications          Chucho Garcia  12/12/2017  "

## 2017-12-13 NOTE — PROGRESS NOTES
[facility]  Neurocritical Care  Progress Note    Admit Date: 12/11/2017  Service Date: 12/13/2017  Length of Stay: 2    Subjective:     Chief Complaint: Embolic stroke involving left middle cerebral artery    Interval History: Rising lactate. Lost SaO2 and BP. Suffered PEA arrest-> ROSC-> AL, CVC-> severe acidosis, hypoglycemia, UGIB-> resuscitation, cardioversion. GI consulted and decided against EGD overnight. Today pH again worsening, BP, HCT dropping-> resuscitation. General Surgery evaluated for ischemic bowel and determined patient was not a surgical candidate. UGIB recurred-> GI consult-> EVD-> ischemic gastritis/enteritis. Family meeting.    Review of Systems: Unable to obtain a complete ROS due to level of consciousness.     Vitals:   Temp: (!) 100.4 °F (38 °C)  Pulse: (!) 118  Rhythm: atrial rhythm  BP: (!) 119/56  MAP (mmHg): 79  CVP (mean): 7 mmHg  CI (L/min/m2): 2.8 L/min/m2  SVI: 37  SVV: 12 %  Resp: 12  SpO2: 97 %  Oxygen Concentration (%): 70  O2 Device (Oxygen Therapy): ventilator  Vent Mode: A/C  Set Rate: 16 bmp  Vt Set: 400 mL  Pressure Support: 0 cmH20  PEEP/CPAP: 5 cmH20  Peak Airway Pressure: 25 cmH2O  Mean Airway Pressure: 13 cmH20  Plateau Pressure: 0 cmH20    Temp  Min: 95 °F (35 °C)  Max: 100.4 °F (38 °C)  Pulse  Min: 45  Max: 157  BP  Min: 47/25  Max: 198/89  MAP (mmHg)  Min: 30  Max: 128  CVP (mean)  Min: 5 mmHg  Max: 8 mmHg  CI (L/min/m2)  Min: 2.8 L/min/m2  Max: 4.8 L/min/m2  SVI  Min: 35  Max: 53  SVV  Min: 10 %  Max: 16 %  Resp  Min: 12  Max: 136  SpO2  Min: 27 %  Max: 100 %  Oxygen Concentration (%)  Min: 40  Max: 100    12/12 0701 - 12/13 0700  In: 3958.3 [I.V.:2233.3]  Out: 580 [Urine:280; Drains:300]   Unmeasured Output  Urine Occurrence: 1  Stool Occurrence: 0  Pad Count: 2     Examination:   Constitutional: Middle aged woman with critical illness.  Eyes: Conjunctiva clear, anicteric. Lids no lesions. + scleral edema.  Head/Ears/Nose/Mouth/Throat/Neck: Dry  mucous membranes. External ears, nose atraumatic.   Cardiovascular: Irregularly irregular. No murmurs. Arms cooler than legs.  Respiratory: Severe hyperventilation. Clear to auscultation.  Gastrointestinal: No hernia. Soft, nontender. + bowel sounds.    Neurologic:  -GCS [id number]  -Does not open eyes to pain. Follows no commands.  -Cranial nerves impaired, particularly R pupil reactive, L pupil sluggish but reactive, roving eye movements, no corneals, no cough, overbreathing vent.  -Motor to pain no movement in arms, legs.  -Sensation above.  Unable to test orientation, language, memory, judgment, insight, fund of knowledge, hearing, shoulder shrug, tongue protrusion, coordination, gait due to level of consciousness.    Medications:   Continuous  fentanyl    norepinephrine bitartrate-D5W Last Rate: 1 mcg/kg/min (12/13/17 0800)   Scheduled  atorvastatin 80 mg Daily   chlorhexidine 15 mL BID   fentaNYL     levetiracetam IVPB 500 mg Q12H   methIMAzole 10 mg BID   metoprolol     pantoprazole 40 mg BID   phenylephrine     piperacillin-tazobactam 4.5 g in dextrose 5 % 100 mL IVPB (ready to mix system) 4.5 g Q8H   sodium bicarbonate 200 mEq Once   sodium bicarbonate 150 mEq Once   sodium bicarbonate     sodium bicarbonate     sodium chloride 0.9% 3 mL Q8H   thiamine (VITAMIN B1) IVPB 100 mg Daily   [START ON 12/14/2017] vancomycin (VANCOCIN) IVPB 15 mg/kg (Dosing Weight) Q24H   PRN  sodium chloride  Q24H PRN   sodium chloride  Q24H PRN   dextrose 50% 12.5 g PRN   glucagon (human recombinant) 1 mg PRN   insulin aspart 1-10 Units Q6H PRN   sodium chloride 0.9% 5 mL PRN      Today I independently reviewed pertinent medications, lines/drains/airways, imaging, cardiology, lab results, microbiology results, notably:      W 14.9, HGB 9.1, , INR 4.7, fibrinogen <70, Na 150, K 3.3, HCO3 15, AG 35, BUN 24, Cr 2.3, AST/ALT 9340/5906, TBili 2.1, 7.4/21/71/-12    CXR tube, line ok, congestion    TTE EF 45%    Sputum culture many  GPC, moderate GNR    Assessment/Plan:     Neuro   * Embolic stroke involving left middle cerebral artery    -from cardioembolism from AF +/- anticoagulation post L M1 thrombectomy complicated by mild hemorrhagic conversion/reperfusion injury  -hold aspirin, heparin given hemorrhagic conversion, ischemic colitis, UGIB  -stop atorvastatin given LFT        Winston coma scale total score 3-8    -bihemispheric injury + multiorgan failure as cause  -fear developing anoxic brain injury        History of right MCA stroke    -see stroke        Pulmonary   Acute respiratory failure with hypoxia    -unable to detect SpO2, guide by PaO2, goal 60        Cardiac/Vascular   Cardiac arrest    -from septic shock, ischemic bowel post resuscitation  -Arctic Sun as able for goal temperature 36 C x24 h, then 37 C then 72 h        Atrial fibrillation with RVR    -stop digoxin, diltiazem given shock  -cardioversion x2        Renal/   HERRERA (acute kidney injury)    -from septic shock, ischemic colitis, multiorgan failure  -ultimately no RRT per family        Lactic acidosis    -massive  -from septic shock, ischemic colitis, multiorgan failure  -temporize with perfusion, NaHCO3 but doubt duration of effect without source control        Hypokalemia    -do not correct given HERRERA        ID   Septic shock    -from ischemic colitis from cardioembolism from AF  -MAP >65  -wean norepinephrine then vasopressin  -Zosyn, vancomycin  -1 L LR  -2 U PRBC  -1 g CaCl2  -NaHCO3 200 mEq  -D5 150 mEq NaHCO3 100 mL/h        Hematology   DIC (disseminated intravascular coagulation)    -HGB >7, fibrinogen >150, INR <1.8, PLT >50  -cryoprecipitate, PRBC 2 units, FFP 4 units STAT        Oncology   Acute blood loss anemia    -from UGIB from ischemic gastroenteritis        Endocrine   Hyperthyroidism    -methimazole        Stress hyperglycemia    -ISS, consider insulin drip        GI   Shock liver    -see septic shock        Ischemic colitis, enteritis, or  enterocolitis    -from cardioembolism from AF  -consider CT ap if stabilizes but suspicion is extremely high given AF, EGD, lactic acidosis, multiorgan failure  -no intervention per General Surgery  -NPO, hold TF  -NGT to low wall suction        UGIB (upper gastrointestinal bleed)    -from ischemic gastroenteritis  -pantoprazole q12h  -appreciate GI input           -q2h ABG, iCa, lytes, HCT  -q6h renal function panel, CBC, INR, PTT  -q12h fibrinogen  -bedrest    Prophylaxis:  Venous Thromboembolism: mechanical  Stress Ulcer: PPI  Ventilator Pneumonia: yes     Activity Orders          Bed rest starting at 12/13 1529        DNR    I met with her family, including her sons and daughters. The patient is unable to participate due to coma from multiorgan failure, bihemispheric strokes, cardiac arrest. This meeting included discussion of the diagnosis, prognosis, and goals of care, was absolutely necessary for treatment decisions, and bore directly on the management of the patient.    I explained Ms Sandoval presented with a L MCA ischemic stroke from AF post thrombectomy. Over course of yesterday, she suffered another cardioembolic event to her bowel. Because of acute ischemic colitis, her condition worsened, and she suffered cardiac arrest. She was resuscitated but her ischemic bowel has worsened with consequent septic shock, HERRERA, massive lactic acidosis, and UGIB. She has been evaluated by General Surgery and GI, and she is not a candidate for any meaningful intervention or surgery. Her condition remains dire, and any intervention without source control is expected to be futile. I discussed paths of care, including    1. Full medical support  2. Above with certain limits, particularly DNR and no renal replacement therapy  3. Comfort measures only.    Family understands that regardless of their decision, it is expected she will deteriorate imminently and die. After several discussions, her family together agrees on 2. They  declare she is DNR, and they do not request renal replacement therapy.    Quang Wolf MD  Neurocritical Care  [facility]    Uninterrupted Critical Care/Counseling Time (not including procedures):   160 minutes of critical care time were spent today directly by me in addition to any separately billable procedures ([id number]).

## 2017-12-13 NOTE — ASSESSMENT & PLAN NOTE
-from cardioembolism from AF +/- anticoagulation post L M1 thrombectomy complicated by mild hemorrhagic conversion/reperfusion injury  -hold aspirin, heparin given hemorrhagic conversion, ischemic colitis, UGIB  -stop atorvastatin given LFT

## 2017-12-13 NOTE — PROCEDURES
"Lary Sandoval is a 52 y.o. female patient.    Temp: 96.1 °F (35.6 °C) (12/12/17 2243)  Pulse: 81 (12/12/17 2243)  Resp: (!) 98 (12/12/17 2243)  BP: (!) 152/48 (12/12/17 2243)  SpO2: (!) 77 % (12/12/17 2052)  Weight: 58 kg (127 lb 13.9 oz) (12/11/17 1905)  Height: 5' 1" (154.9 cm) (12/11/17 1905)       Central Line  Date/Time: 12/12/2017 10:56 PM  Performed by: CHUCHO GARCIA.  Consent Done: Yes  Time out: Immediately prior to procedure a "time out" was called to verify the correct patient, procedure, equipment, support staff and site/side marked as required.  Indications: vascular access, hemodynamic monitoring and med administration  Preparation: skin prepped with ChloraPrep  Skin prep agent dried: skin prep agent completely dried prior to procedure  Sterile barriers: all five maximum sterile barriers used - cap, mask, sterile gown, sterile gloves, and large sterile sheet  Hand hygiene: hand hygiene performed prior to central venous catheter insertion  Location details: right subclavian  Catheter type: triple lumen  Catheter size: 7 Fr  Manometry: Yes  Number of attempts: 1  Assessment: placement verified by x-ray,  no pneumothorax on x-ray,  tip termination and successful placement  Complications: none  Specimens: No  Implants: No  Post-procedure: line sutured,  chlorhexidine patch,  blood return through all ports and sterile dressing applied  Complications: No          Chucho Garcia  12/12/2017  "

## 2017-12-13 NOTE — ASSESSMENT & PLAN NOTE
53 yo F transferred to [facility] after found to have acute L. MCA stroke 12/11 s/p mechanical thrombectomy by Ir. Patient coded last night and subsequently noted to have evidence of widespread ischemia including lactic acidosis, ischemic hepatitis, GI bleed. She was started on pressors.     Laboratory evidence today consistent with DIC and patient with continued bloody output from OG tube. Strong suspicion for ischemic gastritis and ischemic bowel causing her GI Bleed. EGD performed bedside today to evaluate mucosa with findings consistent with ischemia. No endoscopic intervention able to performed which would stop her bleeding.    Recommendations:  - Ulcerated, ischemic mucosa in the stomach and small bowel likely from ischemic insult last night  - EGD done today with endoscopic intervention indicated  - No further GI intervention warranted  - Patient with poor prognosis and may go on to develop necrotic bowel, recommend goals of care discussion with family

## 2017-12-13 NOTE — TREATMENT PLAN
GI Note    Called regarding patient Jaime, a 53 y/o woman with medical history of R MCA stroke, with residual LSW, Afib on anticoagulation (eliquis), CAD / HTN / CHF on amiodarone, CCB, HCTZ, BB, nitrate presenting as transfer from OSH for acute L MCA stroke.     Patient has reported history of GI bleed however no overt GI blood loss was witnessed prior to this evening.      Tonight the patient coded, and ROSC was achieved.  Shortly after the code an OG was inserted which danielle back ~300cc of dark red contents.     Of note the patient has remained critically ill since transfer to [facility] with a lactate this afternoon of >12.       Assessment:    Bloody OG output following cardiac arrest with code and ROSC.    - At this point it remains unclear if the patient ever had any overt GI bleeding prior to tonight's events.  No melena, hematochezia, or hematemesis was noted prior to the code.  Certainly now it appears that she has active upper GI bleeding which may be a sequela of mucosal ischemia.   - The patient's BUN this AM was 17 and post code has risen to 23 with a Cr of 2.0 again pointing away from a massive upper GI bleed being present this AM or prior to tonight's code.        Recommendations:  - Continue resuscitation, patient is currently post-code with ROSC and needs ongoing aggressive resuscitation   - transfuse PRBC   - give FFP for INR of 2.4   - continue IVF for lactate>12  - Start protonix gtt (currently on IV BID - can continue this if issues with gtt)  - GI will continue to follow closely along however currently the patient is extremely high risk for an endoscopic procedure.  The patient will have to be aggressively resuscitated prior to considering an EGD.    - Certainly if the cause of the patient's upper GI bleeding is gastrointestinal ischemia then supportive care with blood products, avoiding further hypotension, will be the best course for this patient as endoscopic treatment is usually futile in these  cases.     - Please call with any change in patient status, will continue to follow along closely.     Case discussed with staff.       Hussein Gilmore   Gastroenterology Fellow PGY VI  [phone]

## 2017-12-13 NOTE — SIGNIFICANT EVENT
Called to bedside for Asystole chest compressions in progress. Code per documentation with ROSC after multiple rounds of medications. CBG found to be <20.   Time Event Details User   20:14:26 Progress Notes Pt was coded, RT, RN's and the team were at bedside and resuscitated the Pt. We are all still at bedside monitor the Pt closely. Juan Jose Mora, RRT   20:03:01 Staff Departed Jelani Jackson NP [Attending] (Automatically marked out by Code End event); Laura Oviedo RN [Registered Nurse] (Automatically marked out by Code End event); Connie Ramirez RN [Registered Nurse] (Automatically marked out by Code End event)  Blaine Canales RN   20:03:01 Code End Pulses palpated to femoral and carotid.    Blaine Canales RN   20:03:00 Code Outcome Outcome   Survival: Yes Code Termination Due to: Return of Spontaneous Circulation    Blaine Canales RN   20:03:00 Code Rhythms - ABCs Cardiac Rhythm   Rhythm: junctional rhythm    ABCs   Airway: Clear Breathing: Absent   Pulses: Carotid Present; Femoral Present     Blaine Canales RN   20:00:59 Medication Ordered and Given EPINEPHrine injection - Dose: 1 mg ; Route: Intravenous ; Line:      Intraosseous Line 12/12/17 1950 Humerus ; Site: Other   Ordered by: DAMARI Musa RN   20:00:53 Medication Ordered and Given sodium bicarbonate 8.4 % (1 mEq/mL) injection - Dose: 50 mEq ; Route: Intravenous ; Line:      Intraosseous Line 12/12/17 1950 Humerus   Ordered by: DAMARI Musa RN   20:00:00 MEWS MEWS Score   MEWS Score: 7     Cadencesystem Batch   20:00:00 Medication Ordered and New Bag dextrose 50% (D50W) solution - Dose: 50 mL ; Rate: 999 mL/hr ; Route: Intravenous ; Line:      Intraosseous Line 12/12/17 1950 Humerus ; Comment: BG <20   Ordered by: DAMARI Musa RN   20:00:00 Medication Canceled Entry atropine 0.4 mg/mL injection - Scheduled Time: 2000  Sammi Sun RN   19:58:15 Code Interventions  Interventions   Compressions: Resumed     Blaine Canales RN   19:58:00 Code Rhythms - ABCs Cardiac Rhythm   Ventricular Rhythm: asystole    ABCs   Airway: Clear Breathing: Absent   Pulses: Absent Comments: pulse check    Blaine Canales RN   19:57:00 Medication Ordered and Given EPINEPHrine injection - Dose: 1 mg ; Route: Intravenous ; Site: Other ; Comment: left humerous IO   Ordered by: DAMARI Musa RN   19:56:01 Code Rhythms - ABCs Cardiac Rhythm   Ventricular Rhythm: asystole    ABCs   Airway: Clear Breathing: Absent   Pulses: Absent     Blaine Canales RN   19:56:00 Staff Arrived Jelani Jackson NP [Attending]; Laura Oviedo RN [Registered Nurse]; Connie Ramirez, RN [Registered Nurse]  Blaine Canales RN   19:56:00 Code Start         Critical care time > 45

## 2017-12-13 NOTE — PROGRESS NOTES
I was called at bedside by KIRK Rosales because she couldn't get a pulse oximeter reading on the Pt. We have tried every body's parts but we no luck. Will continue to monitor the Pt.

## 2017-12-13 NOTE — INTERVAL H&P NOTE
The patient has been examined and the H&P has been reviewed:    I concur with the findings and no changes have occurred since H&P was written.     History and Exam unchanged from visit.    Procedure - EGD  Neck - supple  Plan of anesthesia - General  ASA - per anesthesia  Mallampati - per anesthesia  Anesthesia problems - no  Family history of anesthesia problems - no      Anesthesia/Surgery risks, benefits and alternative options discussed and understood by patient/family.          Active Hospital Problems    Diagnosis  POA    *Embolic stroke involving left middle cerebral artery [I63.412]  Yes    Lactic acidosis [E87.2]  Unknown    Tessa coma scale total score 3-8 [R40.2430]  Unknown    History of right MCA stroke [Z86.73]  Not Applicable    CAD (coronary artery disease) [I25.10]  Unknown    CHF (congestive heart failure) [I50.9]  Unknown    Cytotoxic cerebral edema [G93.6]  Yes    S/P carotid endarterectomy [Z98.890]  Not Applicable    Atrial fibrillation with RVR [I48.91]  Yes    Essential hypertension [I10]  Yes    COPD (chronic obstructive pulmonary disease) [J44.9]  Yes    Hypokalemia [E87.6]  Yes      Resolved Hospital Problems    Diagnosis Date Resolved POA   No resolved problems to display.

## 2017-12-13 NOTE — PROGRESS NOTES
Notified [facility] of patients urine turning pink.  Also notified them that patients Sclera appear to be yellowing.  Also that patients most recent potassium is 3.3.  Awaiting further orders.

## 2017-12-13 NOTE — ASSESSMENT & PLAN NOTE
-massive  -from septic shock, ischemic colitis, multiorgan failure  -temporize with perfusion, NaHCO3 but doubt duration of effect without source control

## 2017-12-13 NOTE — PROVATION PATIENT INSTRUCTIONS
Discharge Summary/Instructions after an Endoscopic Procedure  Patient Name: Lary Sandoval  Patient MRN: [id number]  Patient YOB: 1965 Wednesday, December 13, 2017  Heath Shankar MD  RESTRICTIONS:  During your procedure today, you received medications for sedation.  These   medications may affect your judgment, balance and coordination.  Therefore,   for 24 hours, you have the following restrictions:   - DO NOT drive a car, operate machinery, make legal/financial decisions,   sign important papers or drink alcohol.    ACTIVITY:  The following day: return to full activity including work, except no heavy   lifting, straining or running for 3 days if polyps were removed.  DIET:  Eat and drink normally unless instructed otherwise.     TREATMENT FOR COMMON SIDE EFFECTS:  - Mild abdominal pain, belching, bloating or excessive gas: rest, eat   lightly and use a heating pad.  - Sore Throat: treat with throat lozenges and/or gargle with warm salt   water.  SYMPTOMS TO WATCH FOR AND REPORT TO YOUR PHYSICIAN:  1. Abdominal pain or bloating, other than gas cramps.  2. Chest pain.  3. Back pain.  4. Chills or fever occurring within 24 hours after the procedure.  5. Rectal bleeding, which would show as bright red, maroon, or black stools.   (A tablespoon of blood from the rectum is not serious, especially if   hemorrhoids are present.)  6. Vomiting.  7. Weakness or dizziness.  8. Because air was used during the procedure, expelling large amounts of air   from your rectum or belching is normal.  9. If a bowel prep was taken, you may not have a bowel movement for 1-3   days.  This is normal.  GO DIRECTLY TO THE NEAREST EMERGENCY ROOM IF YOU HAVE ANY OF THE FOLLOWING:      Difficulty breathing  Chills and/or fever over 101 F   Persistent vomiting and/or vomiting blood   Severe abdominal pain   Severe chest pain   Black, tarry stools   Bleeding- more than one tablespoon   Any other symptom or condition that you may feel needs  urgent attention  Your doctor recommends these additional instructions:  If any biopsies were taken, your doctor s clinic will contact you in 1 to 2   weeks with any results.  The findings and recommendations were discussed with your family.   The findings and recommendations were discussed with your referring   physician.  For questions, problems or results please call your physician - Heath Shankar MD at Work:  [phone].  [facility], EMERGENCY ROOM PHONE NUMBER: [phone]  IF A COMPLICATION OR EMERGENCY SITUATION ARISES AND YOU ARE UNABLE TO REACH   YOUR PHYSICIAN - GO DIRECTLY TO THE EMERGENCY ROOM.  Heath Shankar MD  12/13/2017 1:44:25 PM  This report has been verified and signed electronically.

## 2017-12-13 NOTE — ASSESSMENT & PLAN NOTE
-from septic shock, ischemic bowel post resuscitation  -Arctic Sun as able for goal temperature 36 C x24 h, then 37 C then 72 h

## 2017-12-13 NOTE — PROGRESS NOTES
N[facility] called to bedside due to patient bradycardia.  Code cart at bedside.      0810pm patient asystole.  Code started.

## 2017-12-13 NOTE — PROGRESS NOTES
Pt was coded, RT, RN's and the team were at bedside and resuscitated the Pt. We are all still at bedside monitor the Pt closely.

## 2017-12-13 NOTE — SUBJECTIVE & OBJECTIVE
Past Medical History:   Diagnosis Date    Anticoagulant long-term use     Atrial fibrillation     CHF (congestive heart failure)     COPD (chronic obstructive pulmonary disease) 4/7/2016    Coronary artery disease     Encounter for blood transfusion 4/6/2016    received 3 units blood yest    Hypertension     MI (myocardial infarction)     Stroke        Past Surgical History:   Procedure Laterality Date    APPENDECTOMY      CARDIAC CATHETERIZATION      x2 no stents    COLONOSCOPY N/A 4/8/2016    Procedure: COLONOSCOPY;  Surgeon: Roque Bobby MD;  Location: [facility];  Service: Endoscopy;  Laterality: N/A;    knee repl;acement      pt states did not have knee repalcement,acl repair only    TUBAL LIGATION         Review of patient's allergies indicates:   Allergen Reactions    Clonidine Itching and Swelling    Tramadol Itching and Swelling    Penicillins      Family History     Problem Relation (Age of Onset)    Hypertension Mother, Father    Stroke Father        Social History Main Topics    Smoking status: Former Smoker     Packs/day: 0.25     Types: Cigarettes     Quit date: 1/26/2017    Smokeless tobacco: Never Used    Alcohol use Not on file    Drug use: No    Sexual activity: Yes     Partners: Male     Birth control/ protection: None     Review of Systems   Unable to perform ROS: Intubated     Objective:     Vital Signs (Most Recent):  Temp: 97.2 °F (36.2 °C) (12/13/17 1200)  Pulse: (!) 136 (12/13/17 1200)  Resp: (!) 90 (12/13/17 1200)  BP: (!) 144/88 (12/13/17 0935)  SpO2: 97 % (12/13/17 1149) Vital Signs (24h Range):  Temp:  [95 °F (35 °C)-100.4 °F (38 °C)] 97.2 °F (36.2 °C)  Pulse:  [] 136  Resp:  [] 90  SpO2:  [27 %-100 %] 97 %  BP: ()/(25-89) 144/88  Arterial Line BP: ()/(25-98) 118/84     Weight: 58 kg (127 lb 13.9 oz) (12/11/17 1905)  Body mass index is 24.16 kg/m².      Intake/Output Summary (Last 24 hours) at 12/13/17 1215  Last data filed at 12/13/17  1156   Gross per 24 hour   Intake          4959.35 ml   Output              480 ml   Net          4479.35 ml       Lines/Drains/Airways     Drain                 NG/OG Tube 12/11/17 Right mouth 2 days         Urethral Catheter 12/12/17 1200 1 day          Airway                 Airway - Non-Surgical 12/11/17 2 days          Arterial Line                 Arterial Line 12/12/17 2100 Left Femoral less than 1 day          Intraosseous Line                 Intraosseous Line 12/12/17 1950 Humerus less than 1 day          Peripheral Intravenous Line                 Peripheral IV - Single Lumen 12/11/17 Left Hand 2 days         Peripheral IV - Single Lumen 12/11/17 Right Hand 2 days                Physical Exam   Constitutional:   Intubated, sedated   HENT:   Mouth/Throat: Oropharynx is clear and moist.   +OG tube with dark contents   Eyes: No scleral icterus.   Cardiovascular:   tachycardiac   Pulmonary/Chest:   Ventilator breath sounds   Abdominal: Soft. Bowel sounds are normal. She exhibits no mass.   +mildly distended   Lymphadenopathy:     She has no cervical adenopathy.   Neurological:   sedated   Skin: Skin is warm and dry.   Psychiatric:   Unable to assess   Vitals reviewed.      Significant Labs:  CBC:   Recent Labs  Lab 12/13/17  0106 12/13/17  0427 12/13/17  0906 12/13/17  0918 12/13/17  1012   WBC 23.95* 14.91*  --  13.36*  --    HGB 9.3* 9.1*  --  7.8*  --    HCT 30.8* 30.2* 21* 26.6* 27*    116*  --  72*  --      BMP:   Recent Labs  Lab 12/13/17  0427 12/13/17  0918   * 83   * 156*   K 3.3* 4.3    102   CO2 15* 15*   BUN 24* 25*   CREATININE 2.3* 2.4*   CALCIUM 7.2* 8.6*   MG 2.0  --      Coagulation:   Recent Labs  Lab 12/13/17  0918   INR 4.7*   APTT 49.2*       Significant Imaging:  Imaging results within the past 24 hours have been reviewed.

## 2017-12-13 NOTE — CONSULTS
GENERAL SURGERY  Consultation      REASON FOR CONSULT:  Bowel ischemia     SUBJECTIVE:     HISTORY OF PRESENT ILLNESS:  Lary Sandoval is a 52 y.o. female who was initially admitted to [facility] on 12/11/2017 for Embolic stroke involving left middle cerebral artery  The patient's hospital course has been complicated by persistent severe lactic acidosis >12 and worsening hemodynamic instability with increasing pressors and A-fib with RVR with current .  The patient also has shock liver.  Overall impression is severe multisystem organ failure and non-survivable ischemic event.        MEDICATIONS:  Home Medications:  No current facility-administered medications on file prior to encounter.      Current Outpatient Prescriptions on File Prior to Encounter   Medication Sig Dispense Refill    lisinopril (PRINIVIL,ZESTRIL) 40 MG tablet Take 40 mg by mouth once daily.      methimazole (TAPAZOLE) 10 MG Tab Take 10 mg by mouth 2 (two) times daily.      aspirin 81 MG Chew Take 1 tablet (81 mg total) by mouth once daily. 30 tablet 0    ELIQUIS 5 mg Tab TK 1 T PO BID FOR 4 WEEKS  0    folic acid (FOLVITE) 1 MG tablet Take 1 mg by mouth once daily.      pantoprazole (PROTONIX) 40 MG tablet TK 1 T PO QD  0     Inpatient Medications:   atorvastatin  80 mg Per OG tube Daily    calcium chloride  1 g Intravenous Once    chlorhexidine  15 mL Mouth/Throat BID    fentaNYL        lactated ringers  1,000 mL Intravenous Once    levetiracetam IVPB  500 mg Intravenous Q12H    methIMAzole  10 mg Per OG tube BID    metoprolol        pantoprazole  40 mg Intravenous BID    phenylephrine        piperacillin-tazobactam 4.5 g in dextrose 5 % 100 mL IVPB (ready to mix system)  4.5 g Intravenous Q8H    sodium bicarbonate  150 mEq Intravenous Once    sodium bicarbonate  200 mEq Intravenous Once    sodium bicarbonate        sodium bicarbonate        sodium bicarbonate        sodium chloride 0.9%  3 mL Intravenous  Q8H    thiamine (VITAMIN B1) IVPB  100 mg Intravenous Daily    [START ON 12/14/2017] vancomycin (VANCOCIN) IVPB  15 mg/kg (Dosing Weight) Intravenous Q24H    vasopressin         Infusions:   fentanyl      norepinephrine bitartrate-D5W 1 mcg/kg/min (12/13/17 0800)    vasopressin (PITRESSIN) infusion       PRN Medications:sodium chloride, sodium chloride, sodium chloride, sodium chloride, dextrose 50%, glucagon (human recombinant), insulin aspart, sodium chloride 0.9%    ALLERGIES:    Review of patient's allergies indicates:   Allergen Reactions    Clonidine Itching and Swelling    Tramadol Itching and Swelling    Penicillins        PAST MEDICAL HISTORY:    Past Medical History:   Diagnosis Date    Anticoagulant long-term use     Atrial fibrillation     CHF (congestive heart failure)     COPD (chronic obstructive pulmonary disease) 4/7/2016    Coronary artery disease     Encounter for blood transfusion 4/6/2016    received 3 units blood yest    Hypertension     MI (myocardial infarction)     Stroke        SURGICAL HISTORY:  Past Surgical History:   Procedure Laterality Date    APPENDECTOMY      CARDIAC CATHETERIZATION      x2 no stents    COLONOSCOPY N/A 4/8/2016    Procedure: COLONOSCOPY;  Surgeon: Roque Bobby MD;  Location: [facility];  Service: Endoscopy;  Laterality: N/A;    knee repl;acement      pt states did not have knee repalcement,acl repair only    TUBAL LIGATION         FAMILY HISTORY:  Family History   Problem Relation Age of Onset    Hypertension Mother     Stroke Father     Hypertension Father        SOCIAL HISTORY:  Social History   Substance Use Topics    Smoking status: Former Smoker     Packs/day: 0.25     Types: Cigarettes     Quit date: 1/26/2017    Smokeless tobacco: Never Used    Alcohol use Not on file        REVIEW OF SYSTEMS:  A 10-point review of systems is negative except for the above mentioned in the HPI.    OBJECTIVE:     Most Recent Vitals:  Temp:  98.2 °F (36.8 °C) (12/13/17 0935)  Pulse: (!) 159 (12/13/17 0935)  Resp: (!) 35 (12/13/17 0935)  BP: (!) 144/88 (12/13/17 0935)  SpO2: 96 % (12/13/17 0928)    24-Hour Vitals:  Temp:  [95 °F (35 °C)-100.4 °F (38 °C)]   Pulse:  []   Resp:  []   BP: ()/(25-89)   SpO2:  [27 %-100 %]   Arterial Line BP: ()/(25-88)     24-Hour I&O:  Intake/Output Summary (Last 24 hours) at 12/13/17 0946  Last data filed at 12/13/17 0935   Gross per 24 hour   Intake          4161.35 ml   Output              580 ml   Net          3581.35 ml       PHYSICAL EXAM:  Intubated sedated unresponsive.  Head normocephalic, atraumatic.  Trachea midline, neck supple.  Respirations unlabored with good inspiratory effort.  Heart regular rate and rhythm.  Abdomen soft, moderately distended, unable to assess tenderness to palpation given mental status.      LABORATORY VALUES:    Recent Labs  Lab 12/11/17  1823  12/12/17 2031 12/13/17 0106 12/13/17 0427 12/13/17 0906 12/13/17 0918   WBC  --   < > 26.38*  --  23.95* 14.91*  --  13.36*   HGB  --   < > 6.1*  --  9.3* 9.1*  --  7.8*   HCT  --   < > 22.7*  < > 30.8* 30.2* 21* 26.6*   PLT  --   < > 211  --  153 116*  --   --    HGBA1C 5.7*  --   --   --   --   --   --   --    < > = values in this interval not displayed.  Recent Labs  Lab 12/12/17  0458 12/12/17  0959  12/12/17 2031 12/12/17  2100 12/13/17 0106 12/13/17 0427   *  --   < > 135* 150* 151* 150*   K 3.5  --   < > 6.1* 5.1 3.7 3.3*     --   < > 101 99 104 100   CO2 16*  --   < > 6* 22* 13* 15*   BUN 13  --   < > 22* 23* 22* 24*   CREATININE 0.8  --   < > 2.2* 2.0* 2.1* 2.3*   *  --   < > 760* 483* 252* 271*   CALCIUM 9.7  --   < > 7.1* 8.4* 7.6* 7.2*   MG 2.5  --   --  2.4  --   --  2.0   PHOS 3.2  --   --  11.7*  --   --  5.7*   AST 54*  --   < > 3,801* 3,684* 7,391* 9,340*   ALT 29  --   < > 3,816* 3,692* 5,904* 5,906*   ALKPHOS 157*  --   < > 120 110 225* 194*   BILITOT 0.5  --   < > 0.5 0.4 1.4*  2.1*   PROT 7.1  --   < > 3.6* 3.1* 4.1* 3.7*   ALBUMIN 3.0*  --   < > 1.5* 1.4* 1.7* 1.6*   PROCAL  --  0.45*  --   --   --   --   --    < > = values in this interval not displayed.  Recent Labs  Lab 12/11/17  1528 12/11/17  1823 12/12/17  0034  12/12/17  2031 12/12/17  2231 12/13/17  0427 12/13/17  0622   INR  --  1.2  --   --  2.4*  --   --   --    LACTATE  --   --   --   < >  --  >12.0* >12.0* >12.0*   TROPONINI 0.083* 0.172* 0.225*  --   --   --   --   --    CPK  --   --   --   --  233*  233*  --   --   --    CPKMB  --   --   --   --  14.7*  --   --   --    MB  --   --   --   --  6.3*  --   --   --    BNP 1,163*  --   --   --   --   --   --   --    < > = values in this interval not displayed.  Recent Labs  Lab 12/13/17  0718 12/13/17  0840 12/13/17  0906   PH 7.402 7.370 7.444   PCO2 20.8* 15.2* 30.5*   PO2 71* 82 76*   HCO3 12.9* 8.8* 20.9*       DIAGNOSTIC STUDIES:  MRI: Reviewed    ASSESSMENT:     Lary Sandoval is a 52 y.o. female admitted to [facility] on 12/11/2017 for Embolic stroke involving left middle cerebral artery      PLAN:  · No acute surgical intervention is recommended or offered at this time.  · Recommend restarting fentanyl gtt and discussion of comfort care measures as soon as family arrives, as sadly this is not a survivable event.  · Please call if you would like surgery to be available for multidisciplinary discussion, we are happy to assist in this difficult situation as needed.      Thank you, and please call back if Surgery can be of any further assistance in the care of this patient.       Joselin Miguel MD  General Surgery, PGY-5  Pager: [phone]  Cell: [phone]

## 2017-12-13 NOTE — H&P (VIEW-ONLY)
Inpatient consult to Physical Medicine Rehab  Consult performed by: LUPIS VILLARREAL  Consult ordered by: LOLIS ANNE  Reason for consult: assess rehab needs        Patient is intubated and too acute.  Will sign off.  Please re-consult when patient is medically stable, participating with therapy, and ready for discharge.      LADONNA Varma, ARJUNP-C  Physical Medicine & Rehabilitation   12/12/2017  Spectralink: [id number]

## 2017-12-13 NOTE — PROGRESS NOTES
NCC notified of inability to obtain blood pressure as well as pulse ox.  NCC Michelle ARANDA at bedside.

## 2017-12-13 NOTE — PROGRESS NOTES
Tried to stick the Pt to get an ABG but no luck . Stoney (RT supervisor) was called at bedside and also tried again with no luck. ROSI Quintero and Connie BRADLEY were at bedside and aware of that. Will continue to monitor.

## 2017-12-13 NOTE — HPI
This is a 51 yo woman with medical history of R MCA stroke, with residual LSW, Afib on anticoagulation (eliquis), CAD / HTN / CHF on amiodarone, CCB, HCTZ, BB, nitrate who was transferred from OSH on 12/11 for acute L MCA stroke. Per chart review, there was initial concern for GI bleed though per documentation unclear why this was suspected. She had a seizure in the ED and was intubated. She underwent thrombectomy by neurointerventional radiology on 12/11. Patient coded yesterday with subsequent achievement of ROSC. She was not noted to have any overt GI bleeding prior to the code per night primary team. On post code labs, she was found to have lactic acidosis of 12, drop in Hg from 102. To 6.1, and ischemic hepatitis. OG tube was then placed with produced ~300cc of dark red contents.   Patient intubated and sedated with poor prognosis and evidence of widespread ischemia.

## 2017-12-13 NOTE — ASSESSMENT & PLAN NOTE
-from cardioembolism from AF  -consider CT ap if stabilizes but suspicion is extremely high given AF, EGD, lactic acidosis, multiorgan failure  -no intervention per General Surgery  -NPO, hold TF  -NGT to low wall suction

## 2017-12-13 NOTE — PLAN OF CARE
Problem: Patient Care Overview  Goal: Plan of Care Review  Outcome: Ongoing (interventions implemented as appropriate)  POC reviewed with pt at 0500. Pt  Unable to verbalize understanding.  NCC call to bedside throughout night. Patient coded with return of pulses after 1 round. Patient cardioverted X2.  Lopressor given X2, Bi carb given throughout night.  Patient started on Norepinephrine and upgraded to max dose.  Central line placed.  A line placed.  Questions and concerns addressed. Family updated over phone and later in person.  CARLOTTA updated on patients status.  Will continue to monitor. See flowsheets for full assessment and VS info

## 2017-12-13 NOTE — ASSESSMENT & PLAN NOTE
-from ischemic colitis from cardioembolism from AF  -MAP >65  -wean norepinephrine then vasopressin  -Zosyn, vancomycin  -1 L LR  -2 U PRBC  -1 g CaCl2  -NaHCO3 200 mEq  -D5 150 mEq NaHCO3 100 mL/h

## 2017-12-13 NOTE — PROGRESS NOTES
Notified ROSI Spivey that patients mariana was no longer patent, 3 nurses and a respiratory therapist cannot get a pulse ox reading from several locations, and the patient's stool appears to have blood in it. No new orders  At this time.

## 2017-12-14 VITALS
RESPIRATION RATE: 20 BRPM | BODY MASS INDEX: 24.15 KG/M2 | TEMPERATURE: 95 F | SYSTOLIC BLOOD PRESSURE: 102 MMHG | HEIGHT: 61 IN | WEIGHT: 127.88 LBS | HEART RATE: 42 BPM | DIASTOLIC BLOOD PRESSURE: 44 MMHG | OXYGEN SATURATION: 97 %

## 2017-12-14 LAB
ALLENS TEST: ABNORMAL
ANION GAP SERPL CALC-SCNC: 73 MMOL/L
BUN SERPL-MCNC: 27 MG/DL
CALCIUM SERPL-MCNC: 11.3 MG/DL
CHLORIDE SERPL-SCNC: 85 MMOL/L
CO2 SERPL-SCNC: 15 MMOL/L
CREAT SERPL-MCNC: 3.1 MG/DL
DELSYS: ABNORMAL
ERYTHROCYTE [SEDIMENTATION RATE] IN BLOOD BY WESTERGREN METHOD: 20 MM/H
EST. GFR  (AFRICAN AMERICAN): 19.1 ML/MIN/1.73 M^2
EST. GFR  (NON AFRICAN AMERICAN): 16.5 ML/MIN/1.73 M^2
FIO2: 100
GLUCOSE SERPL-MCNC: 745 MG/DL
HCO3 UR-SCNC: 18.5 MMOL/L (ref 24–28)
HCT VFR BLD CALC: <15 %PCV (ref 36–54)
MODE: ABNORMAL
PCO2 BLDA: 27.8 MMHG (ref 35–45)
PEEP: 5
PH SMN: 7.43 [PH] (ref 7.35–7.45)
PO2 BLDA: 62 MMHG (ref 80–100)
POC BE: -6 MMOL/L
POC IONIZED CALCIUM: 1.06 MMOL/L (ref 1.06–1.42)
POC SATURATED O2: 93 % (ref 95–100)
POC TCO2: 19 MMOL/L (ref 23–27)
POCT GLUCOSE: 124 MG/DL (ref 70–110)
POCT GLUCOSE: 148 MG/DL (ref 70–110)
POCT GLUCOSE: 414 MG/DL (ref 70–110)
POCT GLUCOSE: >500 MG/DL (ref 70–110)
POTASSIUM BLD-SCNC: 8.9 MMOL/L (ref 3.5–5.1)
POTASSIUM SERPL-SCNC: >9 MMOL/L
SAMPLE: ABNORMAL
SITE: ABNORMAL
SODIUM BLD-SCNC: 156 MMOL/L (ref 136–145)
SODIUM SERPL-SCNC: 173 MMOL/L
VT: 500

## 2017-12-14 NOTE — PROGRESS NOTES
Notified ROSI Martinez of patients HR dropping to 46-47. Per Michelle PA keep MAPS above 65.  Will continue to monitor.

## 2017-12-14 NOTE — PT/OT/SLP DISCHARGE
Occupational Therapy Discharge Summary    Lary Sandoval  MRN: [id number]   Principal Problem: Embolic stroke involving left middle cerebral artery      Patient Discharged from acute Occupational Therapy on 12/14  Please refer to prior OT note dated 12/14 for functional status.    Assessment:      Patient appropriate for care in another setting.    Objective:     GOALS:    Occupational Therapy Goals     Not on file          Multidisciplinary Problems (Resolved)        Problem: Occupational Therapy Goal    Goal Priority Disciplines Outcome Interventions   Occupational Therapy Goal   (Resolved)     OT, PT/OT Outcome(s) achieved    Description:  Goals set 12/12 to be addressed for 14 days with expiration date, 12/26:  Patient will increase functional independence with ADLs by performing:    Patient will demonstrate rolling to the right with max assist.  Not met   Patient will demonstrate rolling to the left with max assist.   Not met  Patient will demonstrate supine -sit with max assist.   Not met  Patient will demonstrate squat pivot transfers with max assist.   Not met  Patient will demonstrate grooming while seated with max assist.   Not met  Patient will demonstrate upper body dressing with max assist while seated EOB.   Not met  Patient will demonstrate lower body dressing with max assist while seated EOB.   Not met  Patient will demonstrate toileting with max assist.   Not met  Patient will demonstrate ability to follow 3/5 commands.   Not met  Patient's family / caregiver will demonstrate independence and safety with assisting patient with self-care skills and functional mobility.     Not met  Patient's family / caregiver will demonstrate independence with providing ROM and changes in bed positioning.   Not met  Patient and/or patient's family will verbalize understanding of stroke prevention guidelines, personal risk factors and stroke warning signs via teachback method.  Not met                           Reasons  for Discontinuation of Therapy Services  Transfer to alternate level of care.      Plan:     Patient Discharged to: Patient .    GAIL Siddiqi  2017

## 2017-12-14 NOTE — SIGNIFICANT EVENT
Death Note    Called to bedside by patient's nurse. Nursing supervisor notified. Family at bedside.  has been called and is also at bedside.    Patient is not responding to verbal or tactile stimuli. Patient does not have a papillary or corneal reflex. His pupils are fixed and dilated. No heart or breath sounds on auscultation. No respirations. No palpable pulses.     Time of death:  12/13/2017 0457    Cause of Death:  Hyperkalemia leading to cardiac arrest    So Joseph,   Internal Medicine, PGY-1

## 2017-12-14 NOTE — SIGNIFICANT EVENT
Death Note  Critical Care Medicine      Admit Date: 2017    Date of Death: 2017    Time of Death:  7 17     Attending Physician: Linus Cool MD    Principal Diagnoses: Embolic stroke involving left middle cerebral artery    Preliminary Cause of Death: Embolic stroke involving left middle cerebral artery    Secondary Diagnoses:   Active Hospital Problems    Diagnosis  POA    *Embolic stroke involving left middle cerebral artery [I63.412]  Yes    UGIB (upper gastrointestinal bleed) [K92.2]  Unknown    Acute blood loss anemia [D62]  Unknown    Septic shock [A41.9, R65.21]  Unknown    Ischemic colitis, enteritis, or enterocolitis [K55.9]  Unknown    DIC (disseminated intravascular coagulation) [D65]  Unknown    Shock liver [K72.00]  Unknown    Acute respiratory failure with hypoxia [J96.01]  Unknown    HERRERA (acute kidney injury) [N17.9]  Unknown    Cardiac arrest [I46.9]  Unknown    Stress hyperglycemia [R73.9]  Unknown    Hyperthyroidism [E05.90]  Unknown    Lactic acidosis [E87.2]  Unknown    Tessa coma scale total score 3-8 [R40.2430]  Unknown    History of right MCA stroke [Z86.73]  Not Applicable    CAD (coronary artery disease) [I25.10]  Unknown    CHF (congestive heart failure) [I50.9]  Unknown    Cytotoxic cerebral edema [G93.6]  Yes    S/P carotid endarterectomy [Z98.890]  Not Applicable    Atrial fibrillation with RVR [I48.91]  Yes    Essential hypertension [I10]  Yes    COPD (chronic obstructive pulmonary disease) [J44.9]  Yes    Hypokalemia [E87.6]  Yes      Resolved Hospital Problems    Diagnosis Date Resolved POA   No resolved problems to display.        Discharged Condition:     HPI:  Lary Cardozo is a 51 y/o woman with medical history of R MCA stroke, with residual LSW, Afib on anticoagulation (eliquis), CAD / HTN / CHF on amiodarone, CCB, HCTZ, BB, nitrate presenting as transfer from    from [facility] for acute L MCA  stroke.     History per records. Patient presented with weakness and fall with abdominal pain. Admitted for suspicion for GIB, however while in ED had a seizure, required intubation. CT showed acute L MCA occlusion.     At [facility], CTA revealed LVO / M1 and admitted for neurointerventional procedure / NICU close monitoring.     Hospital/ICU Course:  12/11: CTA L M1 occlusion. IR procedure. Afib with RVR, management with diltiazem.   12/12: S/p M1 thombectomy / TICI 2B / + Reperfusion injury   12/13: Cardiac arrest with ROSC, cardioversion x 2 for afib RVR, AST/ALT 9322/4851  12/14: DNR, K continues to rise Insulin/D50, calcium, sodium bicarb, and kayexalate         Consultations were held with the family regarding the patient's expected poor prognosis. At the direction of the family pt was made a DNR. Despite multiple medications the patients potassium continued to increase causing arrhythmias and eventually cardiac arrest and no chest compressions or ACLS drugs were initiated at the direction of family The patient was subsequently declared dead.

## 2017-12-15 LAB
BACTERIA SPEC AEROBE CULT: NORMAL
BLD PROD TYP BPU: NORMAL
BLD PROD TYP BPU: NORMAL
BLOOD UNIT EXPIRATION DATE: NORMAL
BLOOD UNIT EXPIRATION DATE: NORMAL
BLOOD UNIT TYPE CODE: 5100
BLOOD UNIT TYPE CODE: 5100
BLOOD UNIT TYPE: NORMAL
BLOOD UNIT TYPE: NORMAL
CODING SYSTEM: NORMAL
CODING SYSTEM: NORMAL
DISPENSE STATUS: NORMAL
DISPENSE STATUS: NORMAL
GRAM STN SPEC: NORMAL
TRANS ERYTHROCYTES VOL PATIENT: NORMAL ML
TRANS ERYTHROCYTES VOL PATIENT: NORMAL ML

## 2017-12-16 LAB — CYANIDE BLD-MCNC: <5 UG/DL

## 2017-12-17 LAB
BACTERIA BLD CULT: NORMAL
BACTERIA BLD CULT: NORMAL

## 2017-12-21 NOTE — DISCHARGE SUMMARY
Death Note  Critical Care Medicine        Admit Date: 2017     Date of Death: 2017     Time of Death:  7 17      Attending Physician: Linus Cool MD     Principal Diagnoses: Embolic stroke involving left middle cerebral artery     Preliminary Cause of Death: Embolic stroke involving left middle cerebral artery     Secondary Diagnoses:         Active Hospital Problems     Diagnosis   POA    *Embolic stroke involving left middle cerebral artery [I63.412]   Yes    UGIB (upper gastrointestinal bleed) [K92.2]   Unknown    Acute blood loss anemia [D62]   Unknown    Septic shock [A41.9, R65.21]   Unknown    Ischemic colitis, enteritis, or enterocolitis [K55.9]   Unknown    DIC (disseminated intravascular coagulation) [D65]   Unknown    Shock liver [K72.00]   Unknown    Acute respiratory failure with hypoxia [J96.01]   Unknown    HERRERA (acute kidney injury) [N17.9]   Unknown    Cardiac arrest [I46.9]   Unknown    Stress hyperglycemia [R73.9]   Unknown    Hyperthyroidism [E05.90]   Unknown    Lactic acidosis [E87.2]   Unknown    Garrison coma scale total score 3-8 [R40.2430]   Unknown    History of right MCA stroke [Z86.73]   Not Applicable    CAD (coronary artery disease) [I25.10]   Unknown    CHF (congestive heart failure) [I50.9]   Unknown    Cytotoxic cerebral edema [G93.6]   Yes    S/P carotid endarterectomy [Z98.890]   Not Applicable    Atrial fibrillation with RVR [I48.91]   Yes    Essential hypertension [I10]   Yes    COPD (chronic obstructive pulmonary disease) [J44.9]   Yes    Hypokalemia [E87.6]   Yes       Resolved Hospital Problems     Diagnosis Date Resolved POA   No resolved problems to display.         Discharged Condition:      HPI:  Lary Cardozo is a 53 y/o woman with medical history of R MCA stroke, with residual LSW, Afib on anticoagulation (eliquis), CAD / HTN / CHF on amiodarone, CCB, HCTZ, BB, nitrate presenting as transfer from    from  [facility] for acute L MCA stroke.      History per records. Patient presented with weakness and fall with abdominal pain. Admitted for suspicion for GIB, however while in ED had a seizure, required intubation. CT showed acute L MCA occlusion.      At [facility], CTA revealed LVO / M1 and admitted for neurointerventional procedure / NICU close monitoring.      Hospital/ICU Course:  12/11: CTA L M1 occlusion. IR procedure. Afib with RVR, management with diltiazem.   12/12: S/p M1 thombectomy / TICI 2B / + Reperfusion injury   12/13: Cardiac arrest with ROSC, cardioversion x 2 for afib RVR, AST/ALT 9322/4851  12/14: DNR, K continues to rise Insulin/D50, calcium, sodium bicarb, and kayexalate            Consultations were held with the family regarding the patient's expected poor prognosis. At the direction of the family pt was made a DNR. Despite multiple medications the patients potassium continued to increase causing arrhythmias and eventually cardiac arrest and no chest compressions or ACLS drugs were initiated at the direction of family The patient was subsequently declared dead.                                         Cosigned by: Montrell Shankar MD at 12/18/2017  7:22 AM

## 2021-03-27 NOTE — ED PROVIDER NOTES
Encounter Date: 3/14/2017    SCRIBE #1 NOTE: I, Frank Mejias, am scribing for, and in the presence of,  Dr. Maddox. I have scribed the entire note.       History     Chief Complaint   Patient presents with    Abdominal Pain     with nausea x 1 week     Review of patient's allergies indicates:   Allergen Reactions    Clonidine Itching and Swelling    Tramadol Itching and Swelling    Penicillins      HPI Comments: 03/14/2017  7:56 PM     Chief Complaint: abdominal pain       The patient is a 51 y.o. female who is presenting with a one week hx of constant, abdominal pain, located in the RLQ and suprapubic region. She describes pain as severe and sharp. Associated with nausea but no vomiting and night sweats. She has been taking tylenol every 4 hours without alleviation of her sx's.     She also complains of knots in the back of her neck, which she has had for a few days. She endorses itching and pain. There are no other associated sx's. She has no other complaints at this time. She has a past medical history of Anticoagulant long-term use; Atrial fibrillation; CHF (congestive heart failure); COPD (chronic obstructive pulmonary disease) (4/7/2016); Coronary artery disease; Encounter for blood transfusion (4/6/2016); Hypertension; MI (myocardial infarction); and Stroke.  has a past surgical history that includes Cardiac catheterization; Tubal ligation; Appendectomy; knee repl;acement; and Colonoscopy (N/A, 4/8/2016).                         The history is provided by the patient.     Past Medical History:   Diagnosis Date    Anticoagulant long-term use     Atrial fibrillation     CHF (congestive heart failure)     COPD (chronic obstructive pulmonary disease) 4/7/2016    Coronary artery disease     Encounter for blood transfusion 4/6/2016    received 3 units blood yest    Hypertension     MI (myocardial infarction)     Stroke      Past Surgical History:   Procedure Laterality Date    APPENDECTOMY       CARDIAC CATHETERIZATION      x2 no stents    COLONOSCOPY N/A 4/8/2016    Procedure: COLONOSCOPY;  Surgeon: Roque Bobby MD;  Location: [facility];  Service: Endoscopy;  Laterality: N/A;    knee repl;acement      pt states did not have knee repalcement,acl repair only    TUBAL LIGATION       Family History   Problem Relation Age of Onset    Hypertension Mother     Stroke Father     Hypertension Father      Social History   Substance Use Topics    Smoking status: Former Smoker     Packs/day: 0.25     Types: Cigarettes     Quit date: 1/26/2017    Smokeless tobacco: Never Used    Alcohol use None     Review of Systems   Constitutional: Positive for diaphoresis. Negative for fever.   HENT: Negative for sore throat.    Eyes: Negative for visual disturbance.   Respiratory: Negative for shortness of breath.    Cardiovascular: Negative for chest pain.   Gastrointestinal: Positive for abdominal pain. Negative for nausea.   Genitourinary: Negative for dysuria.   Musculoskeletal: Negative for back pain.   Skin: Positive for rash.   Neurological: Negative for weakness.   Hematological: Does not bruise/bleed easily.   Psychiatric/Behavioral: Negative for confusion.       Physical Exam   Initial Vitals   BP Pulse Resp Temp SpO2   03/14/17 1921 03/14/17 1921 03/14/17 1921 03/14/17 1921 03/14/17 1921   215/95 73 16 98.4 °F (36.9 °C) 100 %     Physical Exam    Nursing note and vitals reviewed.  Constitutional: She appears well-developed. She is not diaphoretic.   HENT:   Head: Normocephalic and atraumatic.   Eyes: Conjunctivae are normal.   Cardiovascular: Normal rate, regular rhythm, normal heart sounds and intact distal pulses. Exam reveals no gallop and no friction rub.    No murmur heard.  Pulmonary/Chest: Breath sounds normal. No respiratory distress. She has no wheezes. She has no rhonchi. She has no rales. She exhibits no tenderness.   Abdominal: Soft. She exhibits no distension and no mass. There is tenderness.  oriented to person, place and time There is guarding. There is no rebound.   Suprapubic tenderness and RLQ tenderness. She also has RUQ tenderness with voluntary guarding, but no rebound.      Genitourinary: Vagina normal and uterus normal. No vaginal discharge found.   Musculoskeletal: She exhibits no edema.   Hand contractures on the left.      Neurological: She is alert and oriented to person, place, and time.   Skin: Skin is dry. Rash noted.   maculopapular flesh colored rash under the upper part of her neck. Area is excoriated. Her skin is dry.     Psychiatric: She has a normal mood and affect.         ED Course   Procedures  Labs Reviewed   CBC W/ AUTO DIFFERENTIAL - Abnormal; Notable for the following:        Result Value    Hemoglobin 10.2 (*)     Hematocrit 32.9 (*)     MCV 75 (*)     MCH 23.1 (*)     MCHC 30.9 (*)     RDW 20.3 (*)     Platelets 478 (*)     MPV 7.1 (*)     Eosinophil% 19.0 (*)     All other components within normal limits   COMPREHENSIVE METABOLIC PANEL - Abnormal; Notable for the following:     Potassium 3.4 (*)     All other components within normal limits   URINALYSIS - Abnormal; Notable for the following:     Specific Gravity, UA <=1.005 (*)     Leukocytes, UA 1+ (*)     All other components within normal limits   ACETAMINOPHEN LEVEL - Abnormal; Notable for the following:     Acetaminophen (Tylenol), Serum 3.0 (*)     All other components within normal limits   URINALYSIS MICROSCOPIC - Abnormal; Notable for the following:     Trichomonas, UA Rare (*)     All other components within normal limits   VAGINAL SCREEN - Abnormal; Notable for the following:     Trichomonas Many (*)     WBC - Vaginal Screen Many (*)     Bacteria - Vaginal Screen Moderate (*)     All other components within normal limits   LIPASE   LACTIC ACID, PLASMA     EKG Readings: (Independently Interpreted)   Initial Reading: No STEMI.   Normal sinus rhythm, 66 bpm, normal axis, nonspecific T-wave changes       X-Rays:   Independently Interpreted Readings:    Other Readings:  XRAY Abd flat and erect: there is no free air under the diaphram. No evidence of obstruction. No emergent acute process identified.      Medical Decision Making:   History:   Old Medical Records: I decided to obtain old medical records.  Initial Assessment:   Patient presents with a for evaluation of nausea, right-sided and lower abdominal pain for approximately one week.  She is very well-appearing, afebrile and nontoxic.  Laboratory evaluation showed no evidence of dehydration.  She did have Trichomonas in her urine and vaginal screen.  CT abdomen and pelvis was negative for any acute abdominal pelvic process.  She did appear to be constipated.  I doubt obstruction, acute pancreatitis, peritonitis, acute appendicitis, ovarian torsion, PID.  She received antiemetics, dicyclomine, morphine in the ED with improvement in her symptoms.  She is tolerating oral intake without difficulty.  She'll be discharged home with MiraLAX and Flagyl.  She is to follow-up with her PCP as needed.  Safe sex precautions discussed.  She is discharged improved in no acute distress.            Scribe Attestation:   Scribe #1: I performed the above scribed service and the documentation accurately describes the services I performed. I attest to the accuracy of the note.    Attending Attestation:           Physician Attestation for Scribe:  Physician Attestation Statement for Scribe #1: I, Dr. Maddox, reviewed documentation, as scribed by Frank Mejias in my presence, and it is both accurate and complete.                 ED Course     Clinical Impression:   The primary encounter diagnosis was Trichomonal infection. A diagnosis of Lower abdominal pain was also pertinent to this visit.          Angel Luis Maddox MD  03/15/17 0401
